# Patient Record
Sex: MALE | Race: WHITE | NOT HISPANIC OR LATINO | Employment: OTHER | URBAN - METROPOLITAN AREA
[De-identification: names, ages, dates, MRNs, and addresses within clinical notes are randomized per-mention and may not be internally consistent; named-entity substitution may affect disease eponyms.]

---

## 2021-02-02 LAB — SURGICAL PATHOLOGY REPORT: NORMAL

## 2022-01-05 LAB
ABO: NORMAL
ANION GAP SERPL CALCULATED.3IONS-SCNC: 15 MMOL/L (ref 10–20)
ANTIBODY SCREEN: NORMAL
BICARBONATE: 25 MMOL/L (ref 21–32)
CALCIUM SERPL-MCNC: 9.4 MG/DL (ref 8.6–10.6)
CHLORIDE BLD-SCNC: 106 MMOL/L (ref 98–107)
CREAT SERPL-MCNC: 1.15 MG/DL (ref 0.5–1.3)
ERYTHROCYTE [DISTWIDTH] IN BLOOD BY AUTOMATED COUNT: 13.8 % (ref 11.5–14)
GFR AFRICAN AMERICAN: >60 ML/MIN/1.73M2
GFR NON-AFRICAN AMERICAN: >60 ML/MIN/1.73M2
GLUCOSE: 76 MG/DL (ref 74–99)
HCT VFR BLD CALC: 43.8 % (ref 41–52)
HEMOGLOBIN: 14.3 G/DL (ref 13.5–17)
MCHC RBC AUTO-ENTMCNC: 32.6 G/DL (ref 32–36)
MCV RBC AUTO: 95 FL (ref 80–100)
NUCLEATED RBCS: 0 /100 WBC (ref 0–0)
PLATELET # BLD: 169 X10E9/L (ref 150–450)
POTASSIUM SERPL-SCNC: 3.9 MMOL/L (ref 3.5–5.3)
RBC # BLD: 4.63 X10E12/L (ref 4.5–5.9)
RH TYPE: NORMAL
SODIUM BLD-SCNC: 142 MMOL/L (ref 136–145)
UREA NITROGEN: 9 MG/DL (ref 6–23)
WBC: 6.4 X10E9/L (ref 4.4–11.3)

## 2022-02-16 LAB
ABO: NORMAL
ANION GAP SERPL CALCULATED.3IONS-SCNC: 20 MMOL/L (ref 10–20)
ANTIBODY SCREEN: NORMAL
BICARBONATE: 23 MMOL/L (ref 21–32)
CALCIUM SERPL-MCNC: 9.7 MG/DL (ref 8.6–10.6)
CHLORIDE BLD-SCNC: 103 MMOL/L (ref 98–107)
CREAT SERPL-MCNC: 1.02 MG/DL (ref 0.5–1.3)
EGFR MALE: 78 ML/MIN/1.73M2
ERYTHROCYTE [DISTWIDTH] IN BLOOD BY AUTOMATED COUNT: 13.7 % (ref 11.5–14)
GLUCOSE: 63 MG/DL (ref 74–99)
HCT VFR BLD CALC: 51.6 % (ref 41–52)
HEMOGLOBIN: 16.7 G/DL (ref 13.5–17)
MCHC RBC AUTO-ENTMCNC: 32.4 G/DL (ref 32–36)
MCV RBC AUTO: 91 FL (ref 80–100)
NUCLEATED RBCS: 0 /100 WBC (ref 0–0)
PLATELET # BLD: 215 X10E9/L (ref 150–450)
POTASSIUM SERPL-SCNC: 4 MMOL/L (ref 3.5–5.3)
RBC # BLD: 5.64 X10E12/L (ref 4.5–5.9)
RH TYPE: NORMAL
SARS-COV-2, PCR: NOT DETECTED
SODIUM BLD-SCNC: 142 MMOL/L (ref 136–145)
SPECIMEN SOURCE: NORMAL
UREA NITROGEN: 11 MG/DL (ref 6–23)
WBC: 10.1 X10E9/L (ref 4.4–11.3)

## 2023-10-13 ENCOUNTER — TELEPHONE (OUTPATIENT)
Dept: ORTHOPEDIC SURGERY | Facility: CLINIC | Age: 73
End: 2023-10-13
Payer: COMMERCIAL

## 2023-10-13 DIAGNOSIS — G54.6 PHANTOM PAIN AFTER AMPUTATION OF LOWER EXTREMITY (MULTI): ICD-10-CM

## 2023-10-13 DIAGNOSIS — S78.119A ABOVE-KNEE AMPUTATION (MULTI): ICD-10-CM

## 2023-10-13 DIAGNOSIS — M19.011 OSTEOARTHROSIS, LOCALIZED, PRIMARY, SHOULDER REGION, RIGHT: Primary | ICD-10-CM

## 2023-10-13 RX ORDER — OXYCODONE HYDROCHLORIDE 5 MG/1
5 TABLET ORAL EVERY 8 HOURS PRN
Qty: 60 TABLET | Refills: 0 | Status: SHIPPED | OUTPATIENT
Start: 2023-10-13 | End: 2023-11-13 | Stop reason: SDUPTHER

## 2023-10-13 RX ORDER — OXYCODONE HYDROCHLORIDE 5 MG/1
5 TABLET ORAL EVERY 8 HOURS PRN
COMMUNITY
Start: 2023-09-14 | End: 2023-10-13 | Stop reason: SDUPTHER

## 2023-10-13 NOTE — TELEPHONE ENCOUNTER
Sent to [facility].  I know he's still a little early  considering the inpatient meds he got, but this poor franko has multiple reasons for pain, and the oxy probably helps keep him from drinking alcohol

## 2023-11-13 DIAGNOSIS — G54.6 PHANTOM PAIN AFTER AMPUTATION OF LOWER EXTREMITY (MULTI): ICD-10-CM

## 2023-11-13 DIAGNOSIS — S78.119A ABOVE-KNEE AMPUTATION (MULTI): ICD-10-CM

## 2023-11-13 DIAGNOSIS — M19.011 OSTEOARTHROSIS, LOCALIZED, PRIMARY, SHOULDER REGION, RIGHT: ICD-10-CM

## 2023-11-13 RX ORDER — OXYCODONE HYDROCHLORIDE 5 MG/1
5 TABLET ORAL EVERY 8 HOURS PRN
Qty: 60 TABLET | Refills: 0 | Status: SHIPPED | OUTPATIENT
Start: 2023-11-13 | End: 2023-12-13 | Stop reason: SDUPTHER

## 2023-11-20 ENCOUNTER — OFFICE VISIT (OUTPATIENT)
Dept: ORTHOPEDIC SURGERY | Facility: CLINIC | Age: 73
End: 2023-11-20
Payer: COMMERCIAL

## 2023-11-20 DIAGNOSIS — G89.4 CHRONIC PAIN DISORDER: ICD-10-CM

## 2023-11-20 PROCEDURE — 99214 OFFICE O/P EST MOD 30 MIN: CPT | Mod: GC | Performed by: PHYSICAL MEDICINE & REHABILITATION

## 2023-11-20 PROCEDURE — 1159F MED LIST DOCD IN RCRD: CPT | Performed by: PHYSICAL MEDICINE & REHABILITATION

## 2023-11-20 PROCEDURE — 99214 OFFICE O/P EST MOD 30 MIN: CPT | Performed by: PHYSICAL MEDICINE & REHABILITATION

## 2023-11-20 PROCEDURE — 1125F AMNT PAIN NOTED PAIN PRSNT: CPT | Performed by: PHYSICAL MEDICINE & REHABILITATION

## 2023-11-20 RX ORDER — GABAPENTIN 300 MG/1
300 CAPSULE ORAL 3 TIMES DAILY
Qty: 90 CAPSULE | Refills: 11 | Status: SHIPPED | OUTPATIENT
Start: 2023-11-20 | End: 2023-12-13 | Stop reason: SDUPTHER

## 2023-11-20 NOTE — PROGRESS NOTES
FUV      History of Present IllnessSeen at the request of myself - I treated in [facility] [place] rehab.     CC: Follow up for phantom pain evaluation and bilateral shoulder pain.     HPI: F/u for right shoulder pain and repeat evaluation of phantom pain of his right leg, left total hip arthroplasty by Dr. Carter uncomplicated.     Has been ozempic for 5 months. Losing weight. Is starting with a prosthetic but now needs to be fitted.  Has not worked with PT yet.  Left knee replaced 7 years ago, past year has become more painful. Has had falls onto that knee- fell last week on wheelchair.     Continues to have more diffuse shoulder pain and stiffness BILATERALLY, difficulty with prosthesis fit but he is still trying to use it more. Not using walker much due to shoulder pain. Last injections helped for about 3 months, would like repeat injections.      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 .      Patient reports no fevers, chills, sweats, night pain, (unintentional) weight loss or cancer history      Pertinent Physical Exam (see remainder below):  MSK:   Evaluated in wheelchair.  Decreased bilateral shoulder range of motion of the shoulders bilaterally right greater than left.  Right shoulder range of motion mildly reduced in external and flexion and severely reduced in external rotation.     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 Jesus Alberto Carter did left total hip arthroplasty Winter 2022    RECOMMENDATIONS:   - Repeat  Ultrasound guided Bilateral subacromial and anterior glenohumeral joint injections at next available appointment  -Previously discussed surgery for shoulder - probably replacement but but with his right sided transfemoral amputation he will need inpatient rehab or SNF afterwards, and I could facilitate this at [place].  -PNS was denied. - Likely he would need spinal cord stimulator implant. Recall, denial by private insurance company.   - Increasing gabapentin to 300 tid from 300 daily  ( [facility] since [facility])  - Continue oxycodone 5 mg, reduce dose to 2 tablets/day, I suggested splitting them in half and substituting with Tylenol, UDS and controlled substance agreement 11/20/2023. Target of 60 tablets/mo maximum. SEE CONTROLLED SUBSTANCE HPI FORM  f/u ~90d ?AC joint injection     Supervisory note:  Seen with Dr Shannon Nugent, resident.  I saw and evaluated the patient. I personally obtained the key and critical portions of the history and physical exam. I reviewed the resident's documentation and discussed the patient with the resident. I agree with the resident's medical decision making as documented in the resident's note.       Same pain, [facility], needs repeat shoulder injections f/u 90d for  meds.    Rickey Wadsworth M.D, FAAPMR, R-MSK    Chief, Division of PM&R  Board Certified in PM&R, Sports Medicine and Musculoskeletal Ultrasound    Addendum 11/22/2023 - called pt as overdue lab notification popped up.  Wife said he went to lab but they didn't have an order.  I verified that order was in and told them to go to local lab today.  Not wait until injection apt next week as not policy and no lab available at [facility]      Diagnoses and plan discussed with the patient, patient educated on above diagnoses and treatments, including alternatives   Rickey Wadsworth MD, FAAPMR, R-MSK  Chief, Division of PM&R  Board Certified in PM&R  and Sports Medicine  ____________________________________________________________________     SUPPORTING DOCUMENTATION (remaining history, exam, other findings):     Work-up reviewed - this has included x-ray hips, moderate to severe osteoarthritis bilaterally right greater than left prior x-ray of shoulder was 2021     Treatment has included above, medications multiple, physical therapy, inpatient rehab, hip joint injections left side x2  Right subacromial injection helped for about 1.5 months. Back to [facility] now. Then glenohumeral injection was helpful but on last visit October 28, 2022 he had more pain in the superior aspect of the shoulder and we did AC joint injection again with relief there.  See above for Assessment and Plan           Last urine drug screening date/ordered today: 11/20/23   Controlled Substance Agreement:   I have printed this form and reviewed each line item with the patient and the patient has verbalized understanding.   Date of the last Controlled Substance Agreement: 11/20/23  OPIOID   What is the patient's goal of therapy? pain relief, ambulation with prosthesis.  Is this being achieved with current treatment? yes.   Attestation statement: I feel that it is clinically indicated to continue this current medication regimen after consideration of alternative therapies, and other non-opioid treatments.   Opioid Risk Screening:   Opioid Risk Tool   Last opioid risk screening date/ordered today: 11/20/23  Personal history of substance abuse: Alcohol = 3  Psychological disease: Positive History of Depression = 1  Patient's total score is 4,~within range of Moderate Risk (4-7).   Pain Scale Screening:   Pain Assessment and Documentation Tool (PADT)   Date of Assessment: 06/10/2022  Analgesia:   Patient reports his pain level on average during the past week is 6-7 on a 0 - 10 scale.   Patient reports that his pain level at its worst during the past week was 10 on a 0 -10 scale.   40% of pain has  been relieved during the past week per patient   Query to clinician: Is the patient's pain relief clinically significant? Yes  Activities of Daily Living:   Physical functioning: Better   Family relationships: Same   Social relationships: Same   Mood: Same   Sleep patterns: Same   Overall functioning: Same   Adverse Events:   No, FABIANA HIDALGO is not experiencing side effects from current pain reliever.  Patients overall severity of side effect: None  Is your overall impression that this patient is benefiting (e.g., benefits, such as pain relief, outweigh side effects) from opioid therapy? Yes   Potential Aberrant Drug-Related Behavior: Abusing alcohol or illicit drugs.   Specific Analgesic Plan: Continue present regimen.  I have calculated the patient's Morphine Dose Equivalent (MED):   I have considered referral to Pain Management and/or a specialist, and do not feel it is necessary at this time.   ANTICONVULSANT   Activities of Daily Living:   Physical functioning: Same  Family relationships: Same   Social relationships: Same   Mood: Same   Sleep patterns: Same   Overall functioning: Same   Referrals or Alternatives: Pain Management: 2019?,~Psychiatry/Psychotherapy/ Behavioral Health: ,~Occupational Therapy: 2021,~Physical Therapy: 06/2021 and ongoing,~Cold: ,~Heat: .   Current or Past Use of Non-Controlled Medication: Topical Therapy,~NSAID,~Gabapentin.

## 2023-11-22 ENCOUNTER — LAB (OUTPATIENT)
Dept: LAB | Facility: LAB | Age: 73
End: 2023-11-22
Payer: COMMERCIAL

## 2023-11-22 DIAGNOSIS — G89.4 CHRONIC PAIN DISORDER: ICD-10-CM

## 2023-11-22 PROCEDURE — 82570 ASSAY OF URINE CREATININE: CPT

## 2023-11-22 PROCEDURE — 80354 DRUG SCREENING FENTANYL: CPT

## 2023-11-22 PROCEDURE — 80365 DRUG SCREENING OXYCODONE: CPT

## 2023-11-22 PROCEDURE — 80373 DRUG SCREENING TRAMADOL: CPT

## 2023-11-22 PROCEDURE — 80307 DRUG TEST PRSMV CHEM ANLYZR: CPT

## 2023-11-22 PROCEDURE — 80358 DRUG SCREENING METHADONE: CPT

## 2023-11-22 PROCEDURE — 80361 OPIATES 1 OR MORE: CPT

## 2023-11-22 PROCEDURE — 80368 SEDATIVE HYPNOTICS: CPT

## 2023-11-22 PROCEDURE — 80346 BENZODIAZEPINES1-12: CPT

## 2023-11-23 LAB
AMPHETAMINES UR QL SCN: NORMAL
BARBITURATES UR QL SCN: NORMAL
BZE UR QL SCN: NORMAL
CANNABINOIDS UR QL SCN: NORMAL
CREAT UR-MCNC: 90.1 MG/DL (ref 20–370)
PCP UR QL SCN: NORMAL

## 2023-11-29 ENCOUNTER — OFFICE VISIT (OUTPATIENT)
Dept: ORTHOPEDIC SURGERY | Facility: CLINIC | Age: 73
End: 2023-11-29
Payer: COMMERCIAL

## 2023-11-29 DIAGNOSIS — M19.011 OSTEOARTHROSIS, LOCALIZED, PRIMARY, SHOULDER REGION, RIGHT: Primary | ICD-10-CM

## 2023-11-29 NOTE — PROGRESS NOTES
FUV      History of Present Illness  Seen at the request of Dr. Wadsworth  - I treated in [place] rehab.     CC: Follow up for bilateral shoulder pain.     HPI:  Patient is a 74 y/o male with PMH Right AKA of being followed for  right shoulder pain and repeat evaluation of phantom pain of his right leg, left total hip arthroplasty by Dr. Carter uncomplicated.     Continues to have more diffuse shoulder pain and stiffness BILATERALLY. Not using walker much due to shoulder pain. Last injections helped for about 3 months, presenting today for repeat injections.      Patient reports no fevers, chills, sweats, night pain, (unintentional) weight loss or cancer history      Pertinent Physical Exam (see remainder below):  MSK:   Evaluated in wheelchair.  Decreased bilateral shoulder range of motion of the shoulders is limited bilaterally right greater than left.  Right shoulder range of motion mildly reduced in external and flexion and severely reduced in external rotation.     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 Jesus Alberto Carter did left total hip arthroplasty Winter 2022    RECOMMENDATIONS:   - Today preformed Ultrasound guided Bilateral subacromial and anterior glenohumeral joint injections at next available appointment  -Previously discussed surgery for shoulder - probably replacement but but with his right sided transfemoral amputation he will need inpatient rehab or SNF afterwards, and I could facilitate this at [place].  -PNS and spinal cord stimulator denied by private insurance company.   - gabapentin to 300 tid   - Continue oxycodone 5 mg, reduce dose to 2  tablets/day,     -Procedure: Ultrasound guided Bilateral subacromial and anterior glenohumeral joint injection. The risks, benefits, alternatives and anticipated outcomes of the procedure, and the roles and tasks of the personnel to be involved, were discussed with the patient, and the patient consents to the procedure and agrees to proceed.   The procedure was carried out under sterile prep with sterile gel. A brief sonographic assessment of region showed no significant effusion, deep so low resolution precluded diagnostic assessment. A 25 ga 1.5 inch echogenic needle was introduced and advanced with ultrasound guidance from from lateral to medial into right subacromial space. Then, it was removed and inserted from the medial to lateral into the anterior glenohumeral joint. Following negative aspiration, a mixture of 4 cc of lidocaine and 1 cc of Kenalog (40mg/ml) was injected.   A 25 ga 2 inch echogenic needle was introduced and advanced with ultrasound guidance from from lateral to medial into left subacromial space. Then, it was removed and inserted from the medial to lateral into the anterior glenohumeral joint. Following negative aspiration, a mixture of 4 cc of lidocaine and 1 cc of Kenalog (40mg/ml) was injected.   Ultrasound interpretation was performed prior to the procedure to identify the target and any adjacent neurovascular structures. Subsequently, interpretation was performed during real-time needle guidance confirming placement. Post-intervention interpretation was also performed confirming appropriate injectate flow and hemostasis. The patient tolerated the procedure without complication and was instructed in post-procedure precautions.     Cony Meier DO  MSK and Spine Fellow (PM&R), [facility] Department of Orthopedics   Academic Instructor, [employer]  [address] [place] [address]. [place] [address]  Phone: [phone]  Fax: [phone] l  Inj/Asp: bilateral glenohumeral on 6/13/2024 8:29 AM  Indications: pain  Details: 25 G needle, ultrasound-guided anterolateral approach  Medications (Right): 40 mg triamcinolone acetonide 40 mg/mL; 4 mL lidocaine PF 10 mg/mL (1 %)  Medications (Left): 40 mg triamcinolone acetonide 40 mg/mL; 4 mL lidocaine PF 10 mg/mL (1 %)  Procedure, treatment alternatives, risks and benefits explained, specific risks discussed. Patient was prepped and draped in the usual sterile fashion.

## 2023-12-04 LAB
1OH-MIDAZOLAM UR CFM-MCNC: <25 NG/ML
6MAM UR CFM-MCNC: <25 NG/ML
7AMINOCLONAZEPAM UR CFM-MCNC: <25 NG/ML
A-OH ALPRAZ UR CFM-MCNC: <25 NG/ML
ALPRAZ UR CFM-MCNC: <25 NG/ML
CHLORDIAZEP UR CFM-MCNC: <25 NG/ML
CLONAZEPAM UR CFM-MCNC: <25 NG/ML
CODEINE UR CFM-MCNC: <50 NG/ML
DIAZEPAM UR CFM-MCNC: <25 NG/ML
EDDP UR CFM-MCNC: <25 NG/ML
FENTANYL UR CFM-MCNC: <2.5 NG/ML
HYDROCODONE CTO UR CFM-MCNC: <25 NG/ML
HYDROMORPHONE UR CFM-MCNC: <25 NG/ML
LORAZEPAM UR CFM-MCNC: <25 NG/ML
METHADONE UR CFM-MCNC: <25 NG/ML
MIDAZOLAM UR CFM-MCNC: <25 NG/ML
MORPHINE UR CFM-MCNC: <50 NG/ML
NORDIAZEPAM UR CFM-MCNC: <25 NG/ML
NORFENTANYL UR CFM-MCNC: <2.5 NG/ML
NORHYDROCODONE UR CFM-MCNC: <25 NG/ML
NOROXYCODONE UR CFM-MCNC: >1000 NG/ML
NORTRAMADOL UR-MCNC: <50 NG/ML
OXAZEPAM UR CFM-MCNC: <25 NG/ML
OXYCODONE UR CFM-MCNC: 2228 NG/ML
OXYMORPHONE UR CFM-MCNC: 1339 NG/ML
TEMAZEPAM UR CFM-MCNC: <25 NG/ML
TRAMADOL UR CFM-MCNC: <50 NG/ML
ZOLPIDEM UR CFM-MCNC: <25 NG/ML
ZOLPIDEM UR-MCNC: <25 NG/ML

## 2023-12-13 DIAGNOSIS — S78.119A ABOVE-KNEE AMPUTATION (MULTI): ICD-10-CM

## 2023-12-13 DIAGNOSIS — G89.4 CHRONIC PAIN DISORDER: ICD-10-CM

## 2023-12-13 DIAGNOSIS — M19.011 OSTEOARTHROSIS, LOCALIZED, PRIMARY, SHOULDER REGION, RIGHT: ICD-10-CM

## 2023-12-13 DIAGNOSIS — G54.6 PHANTOM PAIN AFTER AMPUTATION OF LOWER EXTREMITY (MULTI): ICD-10-CM

## 2023-12-13 RX ORDER — OXYCODONE HYDROCHLORIDE 5 MG/1
5 TABLET ORAL EVERY 8 HOURS PRN
Qty: 60 TABLET | Refills: 0 | Status: SHIPPED | OUTPATIENT
Start: 2023-12-13 | End: 2024-01-15 | Stop reason: SDUPTHER

## 2023-12-13 RX ORDER — GABAPENTIN 300 MG/1
300 CAPSULE ORAL 3 TIMES DAILY
Qty: 90 CAPSULE | Refills: 11 | Status: SHIPPED | OUTPATIENT
Start: 2023-12-13 | End: 2024-02-14 | Stop reason: SDUPTHER

## 2024-01-15 DIAGNOSIS — M19.011 OSTEOARTHROSIS, LOCALIZED, PRIMARY, SHOULDER REGION, RIGHT: ICD-10-CM

## 2024-01-15 DIAGNOSIS — S78.119A ABOVE-KNEE AMPUTATION (MULTI): ICD-10-CM

## 2024-01-15 DIAGNOSIS — G54.6 PHANTOM PAIN AFTER AMPUTATION OF LOWER EXTREMITY (MULTI): ICD-10-CM

## 2024-01-15 RX ORDER — OXYCODONE HYDROCHLORIDE 5 MG/1
5 TABLET ORAL EVERY 8 HOURS PRN
Qty: 60 TABLET | Refills: 0 | Status: SHIPPED | OUTPATIENT
Start: 2024-01-15 | End: 2024-02-14 | Stop reason: SDUPTHER

## 2024-02-13 NOTE — PROGRESS NOTES
FUV      History of Present Illness     CC: Follow up for bilateral shoulder pain.     HPI:  Patient is a 74 y/o male with PMH Right AKA of being followed for  right shoulder pain and repeat evaluation of phantom pain of his right leg, left total hip arthroplasty by Dr. Carter uncomplicated.     Continues  to have more diffuse shoulder pain and stiffness BILATERALLY at rest 7-8/10, at its worst 10/10. Not using walker much due to shoulder pain. Last injections on 11/29/23 improved his symptoms by 100% for about 2 months, presenting today for repeat injections.      Patient reports no fevers, chills, sweats, night pain, (unintentional) weight loss or cancer history      Pertinent Physical Exam (see remainder below):  MSK:   Evaluated in wheelchair.  Decreased bilateral shoulder range of motion of the shoulders is limited bilaterally right greater than left.  Right shoulder range of motion mildly reduced in external and flexion and severely reduced in external rotation.     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 Jesus Alberto Carter did left total hip arthroplasty Winter 2022    RECOMMENDATIONS:   - Today preformed Ultrasound guided Bilateral subacromial and anterior glenohumeral joint injections at next available appointment  -Previously discussed surgery for shoulder - probably replacement but with his right sided transfemoral amputation he will need inpatient rehab or SNF afterwards, and I could facilitate this at [place].  -PNS and spinal cord stimulator denied by private insurance company.   - gabapentin to 300 tid   - Continue oxycodone 5 mg, 2  tablets/day    -Procedure: Ultrasound guided Bilateral subacromial and anterior glenohumeral joint injection. The risks, benefits, alternatives and anticipated outcomes of the procedure, and the roles and tasks of the personnel to be involved, were discussed with the patient, and the patient consents to the procedure and agrees to proceed.   The procedure was carried out under sterile prep with sterile gel. A brief sonographic assessment of region showed no significant effusion, deep so low resolution precluded diagnostic assessment. A 25 ga 1.5 inch echogenic needle was introduced and advanced with ultrasound guidance from from lateral to medial into right subacromial space. Then, it was removed and inserted from the medial to lateral into the anterior glenohumeral joint. Following negative aspiration, a mixture of 4 cc of lidocaine and 1 cc of Kenalog (40mg/ml) was injected.   A 25 ga 2 inch echogenic needle was introduced and advanced with ultrasound guidance from from lateral to medial into left subacromial space. Then, it was removed and inserted from the medial to lateral into the anterior glenohumeral joint. Following negative aspiration, a mixture of 4 cc of lidocaine and 1 cc of Kenalog (40mg/ml) was injected.   Ultrasound interpretation was performed prior to the procedure to identify the target and any adjacent neurovascular structures. Subsequently, interpretation was performed during real-time needle guidance confirming placement. Post-intervention interpretation was also performed confirming appropriate injectate flow and hemostasis. The patient tolerated the procedure without complication and was instructed in post-procedure precautions.     Cony Meier DO  MSK and Spine Fellow (PM&R), [facility] Department of Orthopedics   Academic Instructor, [employer]  [address] [place] [address]. [place] [address]  Phone: [phone]  Fax: [phone] l  Inj/Asp: bilateral subacromial bursa on 2/14/2024 1:13 PM  Indications: pain  Details: 25 G needle, ultrasound-guided anterolateral approach  Medications (Right): 5 mg triamcinolone acetonide 40 mg/mL; 4 mL lidocaine PF 10 mg/mL (1 %)  Medications (Left): 5 mg triamcinolone acetonide 40 mg/mL; 4 mL lidocaine PF 10 mg/mL (1 %)  Procedure, treatment alternatives, risks and benefits explained, specific risks discussed. Consent was given by the patient.

## 2024-02-14 ENCOUNTER — OFFICE VISIT (OUTPATIENT)
Dept: ORTHOPEDIC SURGERY | Facility: CLINIC | Age: 74
End: 2024-02-14
Payer: COMMERCIAL

## 2024-02-14 DIAGNOSIS — S78.119A ABOVE-KNEE AMPUTATION (MULTI): ICD-10-CM

## 2024-02-14 DIAGNOSIS — M19.011 OSTEOARTHROSIS, LOCALIZED, PRIMARY, SHOULDER REGION, RIGHT: ICD-10-CM

## 2024-02-14 DIAGNOSIS — M19.011 OSTEOARTHROSIS, LOCALIZED, PRIMARY, SHOULDER REGION, RIGHT: Primary | ICD-10-CM

## 2024-02-14 DIAGNOSIS — G54.6 PHANTOM PAIN AFTER AMPUTATION OF LOWER EXTREMITY (MULTI): ICD-10-CM

## 2024-02-14 DIAGNOSIS — G89.4 CHRONIC PAIN DISORDER: ICD-10-CM

## 2024-02-14 PROCEDURE — 20611 DRAIN/INJ JOINT/BURSA W/US: CPT | Performed by: STUDENT IN AN ORGANIZED HEALTH CARE EDUCATION/TRAINING PROGRAM

## 2024-02-14 PROCEDURE — 1125F AMNT PAIN NOTED PAIN PRSNT: CPT | Performed by: STUDENT IN AN ORGANIZED HEALTH CARE EDUCATION/TRAINING PROGRAM

## 2024-02-14 PROCEDURE — 1159F MED LIST DOCD IN RCRD: CPT | Performed by: STUDENT IN AN ORGANIZED HEALTH CARE EDUCATION/TRAINING PROGRAM

## 2024-02-14 PROCEDURE — 99213 OFFICE O/P EST LOW 20 MIN: CPT | Performed by: STUDENT IN AN ORGANIZED HEALTH CARE EDUCATION/TRAINING PROGRAM

## 2024-02-14 RX ORDER — TRIAMCINOLONE ACETONIDE 40 MG/ML
5 INJECTION, SUSPENSION INTRA-ARTICULAR; INTRAMUSCULAR
Status: COMPLETED | OUTPATIENT
Start: 2024-02-14 | End: 2024-02-14

## 2024-02-14 RX ORDER — OXYCODONE HYDROCHLORIDE 5 MG/1
5 TABLET ORAL EVERY 8 HOURS PRN
Qty: 60 TABLET | Refills: 0 | Status: SHIPPED | OUTPATIENT
Start: 2024-02-14 | End: 2024-02-14 | Stop reason: SDUPTHER

## 2024-02-14 RX ORDER — LIDOCAINE HYDROCHLORIDE 10 MG/ML
4 INJECTION, SOLUTION EPIDURAL; INFILTRATION; INTRACAUDAL; PERINEURAL
Status: COMPLETED | OUTPATIENT
Start: 2024-02-14 | End: 2024-02-14

## 2024-02-14 RX ORDER — OXYCODONE HYDROCHLORIDE 5 MG/1
5 TABLET ORAL EVERY 8 HOURS PRN
Qty: 60 TABLET | Refills: 0 | Status: SHIPPED | OUTPATIENT
Start: 2024-02-14 | End: 2024-03-14 | Stop reason: SDUPTHER

## 2024-02-14 RX ORDER — GABAPENTIN 300 MG/1
300 CAPSULE ORAL 3 TIMES DAILY
Qty: 90 CAPSULE | Refills: 11 | Status: SHIPPED | OUTPATIENT
Start: 2024-02-14 | End: 2025-02-13

## 2024-02-14 RX ADMIN — LIDOCAINE HYDROCHLORIDE 4 ML: 10 INJECTION, SOLUTION EPIDURAL; INFILTRATION; INTRACAUDAL; PERINEURAL at 13:13

## 2024-02-14 RX ADMIN — TRIAMCINOLONE ACETONIDE 5 MG: 40 INJECTION, SUSPENSION INTRA-ARTICULAR; INTRAMUSCULAR at 13:13

## 2024-03-14 ENCOUNTER — OFFICE VISIT (OUTPATIENT)
Dept: ORTHOPEDIC SURGERY | Facility: CLINIC | Age: 74
End: 2024-03-14
Payer: COMMERCIAL

## 2024-03-14 DIAGNOSIS — S78.119A ABOVE-KNEE AMPUTATION (MULTI): ICD-10-CM

## 2024-03-14 DIAGNOSIS — G54.6 PHANTOM PAIN AFTER AMPUTATION OF LOWER EXTREMITY (MULTI): ICD-10-CM

## 2024-03-14 DIAGNOSIS — M19.011 OSTEOARTHROSIS, LOCALIZED, PRIMARY, SHOULDER REGION, RIGHT: ICD-10-CM

## 2024-03-14 DIAGNOSIS — M54.12 CERVICAL RADICULITIS: Primary | ICD-10-CM

## 2024-03-14 PROCEDURE — 1159F MED LIST DOCD IN RCRD: CPT | Performed by: PHYSICAL MEDICINE & REHABILITATION

## 2024-03-14 PROCEDURE — 99214 OFFICE O/P EST MOD 30 MIN: CPT | Performed by: PHYSICAL MEDICINE & REHABILITATION

## 2024-03-14 RX ORDER — OXYCODONE HYDROCHLORIDE 5 MG/1
5 TABLET ORAL EVERY 8 HOURS PRN
Qty: 60 TABLET | Refills: 0 | Status: SHIPPED | OUTPATIENT
Start: 2024-03-14 | End: 2024-04-04 | Stop reason: SDUPTHER

## 2024-03-14 NOTE — PROGRESS NOTES
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 .  f/u for oxycodone , right shoulder pain and repeat evaluation of phantom pain of his right leg, left total hip arthroplastyâ€“by Dr. Carter uncomplicated.      CC: Follow up for phantom pain  and bilateral shoulder pain.     HPI:   Ultrasound-guided bilateral glenohumeral and subacromial injections by Dr. Caitlyn Meier D.O. our fellow in November and then again February 14 2024 helps significantly with the right shoulder but the left side only a little, now feels pain in the left side more in the upper shoulder/lower neck with radiation all the way down the forearm and back of the hand.  Right shoulder still doing well       Has ongoing difficulty with prosthesis fit and he has essentially stopped using it, most time in wheelchair but occasionally with walker, pivot transfers but using the walker is difficult due to due to shoulder pain.      Patient reports no fevers, chills, sweats, night pain, weight loss or cancer history      Pertinent Physical Exam (see remainder below):  MSK:   Cervical Spine: ROM moderately reduced all planes but more painful to left, Posture mod kyphotic, Tender psp, Spurling pos to left  decreased bilateral shoulder range of motion of the shoulders bilaterally right greater than left. right shoulder range of motion mildly reduced in external and flexion and severely reduced in external rotation.   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 Jesus Alberto Carter did left total hip arthroplasty Winter 2022  .  RECOMMENDATIONS:   - Get MRI cervical ?left radiculitis  - discussed possible right shoulder SS, Axillary and Lat pec block/RFA -will re-address when steroid injection wears off  -Previously discussed surgery for shoulder - probably replacement but but with his right sided transfemoral amputation he will need inpatient rehab or SNF afterwards, and I could facilitate this at [place].  -PNS was denied. - Likely he would need spinal cord stimulator implant. Recall, denial by private insurance company.   - off of neurontin due to oversedation?   - continue oxycodone 5 mg, reduce dose to 2 tablets/day, I suggested splitting them in half and substituting with Tylenol, UDS and controlled substance agreement 10/28/2022 -UDS was repeated Oct 2023 at OSH . Target of 60 tablets/mo maximum. SEE CONTROLLED SUBSTANCE HPI FORM  f/u for virtual visit after MRI then ~90d for meds      Diagnoses and plan discussed with the patient, patient educated on above diagnoses and treatments, including alternatives   Rickey Wadsworth MD, FAAPMR, R-MSK  Chief, Division of PM&R  Board Certified in PM&R and Sports Medicine  ____________________________________________________________________     SUPPORTING DOCUMENTATION (remaining history, exam, other findings):     Work-up reviewed - this has included x-ray hips, moderate to severe osteoarthritis bilaterally right greater than left prior x-ray of shoulder was 2021     Treatment has included above, medications multiple, physical therapy, inpatient rehab, hip joint injections left side x2  Right subacromial injection helped for about 1.5 months. Back to [facility] now. Then  glenohumeral injection was helpful but on last visit October 28, 2022 he had more pain in the superior aspect of the shoulder and we did AC joint injection again with relief there.  See above for Assessment and Plan          Last urine drug screening date/ordered: 10/22/2023 - [facility] - [place]   Controlled Substance Agreement:   I have printed this form and reviewed each line item with the patient and the patient has verbalized understanding.   Date of the last Controlled Substance Agreement:    OPIOID   What is the patient's goal of therapy? pain relief, ambulation with prosthesis.  Is this being achieved with current treatment? yes.   Attestation statement: I feel that it is clinically indicated to continue this current medication regimen after consideration of alternative therapies, and other non-opioid treatments.   Opioid Risk Screening:   Opioid Risk Tool   Last opioid risk screening date/ordered today: 10/30/2023  Personal history of substance abuse: Alcohol = 3  Psychological disease: Positive History of Depression = 1  Patient's total score is 4,~within range of Moderate Risk (4-7).   Pain Scale Screening:   Pain Assessment and Documentation Tool (PADT)   Date of Assessment: 08/22/2023  Analgesia:   Patient reports his pain level on average during the past week is 7 on a 0 - 10 scale.   Patient reports that his pain level at its worst during the past week was 10 on a 0 -10 scale.   40 % of pain has been relieved during the past week per patient   Query to clinician: Is the patient's pain relief clinically significant? Yes  Activities of Daily Living:   Physical functioning: Better   Family relationships: Same   Social relationships: Same   Mood: Same   Sleep patterns: Same   Overall functioning: Same   Adverse Events:   No, FABIANA HIDALGO is not experiencing side effects from current pain reliever.  Patients overall severity of side effect: None  Is your overall impression that this patient is benefiting (e.g.,  benefits, such as pain relief, outweigh side effects) from opioid therapy? Yes   Potential Aberrant Drug-Related Behavior: Abusing alcohol or illicit drugs.   Specific Analgesic Plan: Continue present regimen.  I have calculated the patient's Morphine Dose Equivalent (MED):   I have considered referral to Pain Management and/or a specialist, and do not feel it is necessary at this time.   ANTICONVULSANT   Activities of Daily Living:   Physical functioning: Better   Family relationships: Same   Social relationships: Same   Mood: Same   Sleep patterns: Same   Overall functioning: Same   Referrals or Alternatives: Pain Management: 2019?,~Psychiatry/Psychotherapy/ Behavioral Health: ,~Occupational Therapy: 2021,~Physical Therapy: 06/2021 and ongoing,~Cold: ,~Heat: .   Current or Past Use of Non-Controlled Medication: Topical Therapy,~NSAID,~Gabapentin.

## 2024-03-27 ENCOUNTER — HOSPITAL ENCOUNTER (EMERGENCY)
Facility: HOSPITAL | Age: 74
Discharge: HOME | End: 2024-03-28
Attending: EMERGENCY MEDICINE
Payer: COMMERCIAL

## 2024-03-27 ENCOUNTER — APPOINTMENT (OUTPATIENT)
Dept: RADIOLOGY | Facility: HOSPITAL | Age: 74
End: 2024-03-27
Payer: COMMERCIAL

## 2024-03-27 DIAGNOSIS — F10.920 ALCOHOLIC INTOXICATION WITHOUT COMPLICATION (CMS-HCC): ICD-10-CM
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
## 2024-03-27 DIAGNOSIS — S09.90XA CLOSED HEAD INJURY, INITIAL ENCOUNTER: Primary | ICD-10-CM

## 2024-03-27 DIAGNOSIS — R33.9 URINARY RETENTION: ICD-10-CM

## 2024-03-27 PROCEDURE — 12011 RPR F/E/E/N/L/M 2.5 CM/<: CPT

## 2024-03-27 PROCEDURE — 72125 CT NECK SPINE W/O DYE: CPT | Performed by: RADIOLOGY

## 2024-03-27 PROCEDURE — 70450 CT HEAD/BRAIN W/O DYE: CPT

## 2024-03-27 PROCEDURE — 72125 CT NECK SPINE W/O DYE: CPT

## 2024-03-27 PROCEDURE — 51702 INSERT TEMP BLADDER CATH: CPT | Mod: 59

## 2024-03-27 PROCEDURE — 70450 CT HEAD/BRAIN W/O DYE: CPT | Performed by: RADIOLOGY

## 2024-03-27 PROCEDURE — 99285 EMERGENCY DEPT VISIT HI MDM: CPT | Mod: 25

## 2024-03-27 PROCEDURE — 2500000005 HC RX 250 GENERAL PHARMACY W/O HCPCS: Performed by: EMERGENCY MEDICINE

## 2024-03-27 PROCEDURE — 51798 US URINE CAPACITY MEASURE: CPT

## 2024-03-27 RX ADMIN — Medication 1 TUBE: at 21:50

## 2024-03-27 ASSESSMENT — PAIN SCALES - WONG BAKER: WONGBAKER_NUMERICALRESPONSE: NO HURT

## 2024-03-27 ASSESSMENT — LIFESTYLE VARIABLES
HAVE YOU EVER FELT YOU SHOULD CUT DOWN ON YOUR DRINKING: YES
EVER HAD A DRINK FIRST THING IN THE MORNING TO STEADY YOUR NERVES TO GET RID OF A HANGOVER: YES
EVER FELT BAD OR GUILTY ABOUT YOUR DRINKING: YES
HAVE PEOPLE ANNOYED YOU BY CRITICIZING YOUR DRINKING: YES
TOTAL SCORE: 4

## 2024-03-27 ASSESSMENT — PAIN - FUNCTIONAL ASSESSMENT: PAIN_FUNCTIONAL_ASSESSMENT: WONG-BAKER FACES

## 2024-03-28 ENCOUNTER — APPOINTMENT (OUTPATIENT)
Dept: RADIOLOGY | Facility: HOSPITAL | Age: 74
End: 2024-03-28
Payer: COMMERCIAL

## 2024-03-28 VITALS
WEIGHT: 214.95 LBS | BODY MASS INDEX: 32.58 KG/M2 | HEART RATE: 88 BPM | DIASTOLIC BLOOD PRESSURE: 92 MMHG | SYSTOLIC BLOOD PRESSURE: 153 MMHG | HEIGHT: 68 IN | TEMPERATURE: 98.1 F | OXYGEN SATURATION: 100 % | RESPIRATION RATE: 17 BRPM

## 2024-03-28 LAB
ALBUMIN SERPL-MCNC: 3.9 G/DL (ref 3.5–5)
ALP BLD-CCNC: 63 U/L (ref 35–125)
ALT SERPL-CCNC: 16 U/L (ref 5–40)
ANION GAP SERPL CALC-SCNC: 13 MMOL/L
APPEARANCE UR: CLEAR
AST SERPL-CCNC: 16 U/L (ref 5–40)
BASOPHILS # BLD AUTO: 0.03 X10*3/UL (ref 0–0.1)
BASOPHILS NFR BLD AUTO: 0.4 %
BILIRUB SERPL-MCNC: 0.5 MG/DL (ref 0.1–1.2)
BILIRUB UR STRIP.AUTO-MCNC: NEGATIVE MG/DL
BUN SERPL-MCNC: 5 MG/DL (ref 8–25)
CALCIUM SERPL-MCNC: 8.7 MG/DL (ref 8.5–10.4)
CHLORIDE SERPL-SCNC: 105 MMOL/L (ref 97–107)
CO2 SERPL-SCNC: 23 MMOL/L (ref 24–31)
COLOR UR: COLORLESS
CREAT SERPL-MCNC: 0.9 MG/DL (ref 0.4–1.6)
EGFRCR SERPLBLD CKD-EPI 2021: 90 ML/MIN/1.73M*2
EOSINOPHIL # BLD AUTO: 0.04 X10*3/UL (ref 0–0.4)
EOSINOPHIL NFR BLD AUTO: 0.5 %
ERYTHROCYTE [DISTWIDTH] IN BLOOD BY AUTOMATED COUNT: 15.7 % (ref 11.5–14.5)
ETHANOL SERPL-MCNC: 0.3 G/DL
GLUCOSE SERPL-MCNC: 95 MG/DL (ref 65–99)
GLUCOSE UR STRIP.AUTO-MCNC: NORMAL MG/DL
HCT VFR BLD AUTO: 35.9 % (ref 41–52)
HGB BLD-MCNC: 11.9 G/DL (ref 13.5–17.5)
HOLD SPECIMEN: NORMAL
IMM GRANULOCYTES # BLD AUTO: 0.05 X10*3/UL (ref 0–0.5)
IMM GRANULOCYTES NFR BLD AUTO: 0.7 % (ref 0–0.9)
KETONES UR STRIP.AUTO-MCNC: NEGATIVE MG/DL
LEUKOCYTE ESTERASE UR QL STRIP.AUTO: NEGATIVE
LYMPHOCYTES # BLD AUTO: 1.34 X10*3/UL (ref 0.8–3)
LYMPHOCYTES NFR BLD AUTO: 18.3 %
MCH RBC QN AUTO: 29.9 PG (ref 26–34)
MCHC RBC AUTO-ENTMCNC: 33.1 G/DL (ref 32–36)
MCV RBC AUTO: 90 FL (ref 80–100)
MONOCYTES # BLD AUTO: 0.34 X10*3/UL (ref 0.05–0.8)
MONOCYTES NFR BLD AUTO: 4.7 %
NEUTROPHILS # BLD AUTO: 5.51 X10*3/UL (ref 1.6–5.5)
NEUTROPHILS NFR BLD AUTO: 75.4 %
NITRITE UR QL STRIP.AUTO: NEGATIVE
NRBC BLD-RTO: 0 /100 WBCS (ref 0–0)
PH UR STRIP.AUTO: 6 [PH]
PLATELET # BLD AUTO: 137 X10*3/UL (ref 150–450)
POTASSIUM SERPL-SCNC: 3.1 MMOL/L (ref 3.4–5.1)
PROT SERPL-MCNC: 6.5 G/DL (ref 5.9–7.9)
PROT UR STRIP.AUTO-MCNC: NEGATIVE MG/DL
RBC # BLD AUTO: 3.98 X10*6/UL (ref 4.5–5.9)
RBC # UR STRIP.AUTO: NEGATIVE /UL
SODIUM SERPL-SCNC: 141 MMOL/L (ref 133–145)
SP GR UR STRIP.AUTO: 1.01
UROBILINOGEN UR STRIP.AUTO-MCNC: NORMAL MG/DL
WBC # BLD AUTO: 7.3 X10*3/UL (ref 4.4–11.3)

## 2024-03-28 PROCEDURE — 85025 COMPLETE CBC W/AUTO DIFF WBC: CPT | Performed by: EMERGENCY MEDICINE

## 2024-03-28 PROCEDURE — 82077 ASSAY SPEC XCP UR&BREATH IA: CPT | Performed by: EMERGENCY MEDICINE

## 2024-03-28 PROCEDURE — 80053 COMPREHEN METABOLIC PANEL: CPT | Performed by: EMERGENCY MEDICINE

## 2024-03-28 PROCEDURE — 51702 INSERT TEMP BLADDER CATH: CPT

## 2024-03-28 PROCEDURE — 96375 TX/PRO/DX INJ NEW DRUG ADDON: CPT

## 2024-03-28 PROCEDURE — 2500000004 HC RX 250 GENERAL PHARMACY W/ HCPCS (ALT 636 FOR OP/ED): Performed by: STUDENT IN AN ORGANIZED HEALTH CARE EDUCATION/TRAINING PROGRAM

## 2024-03-28 PROCEDURE — 70450 CT HEAD/BRAIN W/O DYE: CPT

## 2024-03-28 PROCEDURE — 70450 CT HEAD/BRAIN W/O DYE: CPT | Performed by: RADIOLOGY

## 2024-03-28 PROCEDURE — 96374 THER/PROPH/DIAG INJ IV PUSH: CPT

## 2024-03-28 PROCEDURE — 81003 URINALYSIS AUTO W/O SCOPE: CPT | Performed by: EMERGENCY MEDICINE

## 2024-03-28 PROCEDURE — 36415 COLL VENOUS BLD VENIPUNCTURE: CPT | Performed by: EMERGENCY MEDICINE

## 2024-03-28 RX ORDER — ONDANSETRON HYDROCHLORIDE 2 MG/ML
4 INJECTION, SOLUTION INTRAVENOUS ONCE
Status: COMPLETED | OUTPATIENT
Start: 2024-03-28 | End: 2024-03-28

## 2024-03-28 RX ORDER — FAMOTIDINE 10 MG/ML
20 INJECTION INTRAVENOUS ONCE
Status: COMPLETED | OUTPATIENT
Start: 2024-03-28 | End: 2024-03-28

## 2024-03-28 RX ORDER — ONDANSETRON HYDROCHLORIDE 2 MG/ML
4 INJECTION, SOLUTION INTRAVENOUS ONCE
Status: DISCONTINUED | OUTPATIENT
Start: 2024-03-28 | End: 2024-03-28 | Stop reason: HOSPADM

## 2024-03-28 RX ADMIN — ONDANSETRON 4 MG: 2 INJECTION INTRAMUSCULAR; INTRAVENOUS at 05:39

## 2024-03-28 RX ADMIN — SODIUM CHLORIDE 500 ML: 900 INJECTION, SOLUTION INTRAVENOUS at 05:34

## 2024-03-28 RX ADMIN — FAMOTIDINE 20 MG: 10 INJECTION, SOLUTION INTRAVENOUS at 05:39

## 2024-03-28 ASSESSMENT — PAIN - FUNCTIONAL ASSESSMENT: PAIN_FUNCTIONAL_ASSESSMENT: 0-10

## 2024-03-28 NOTE — ED NOTES
Physician assessed patient condition. Per physician do not attempt to straight cath patient at this time     Jeanine Brian LPN  03/28/24 [id number]

## 2024-03-28 NOTE — ED NOTES
Patient medicated per md orders wife at bedside assisting patient with urinal     Jeanine Brian, MAGY  03/28/24 [id number]

## 2024-03-28 NOTE — ED NOTES
"As this nurse was updating patient charting, this nurse overheard a loud noise and immediately went to patients room at which time patient was found sitting on left side in an upright position with head against wall. This nurse immediately assessed patient for new onset injury and found none, patient had no complaint of pain or injury and when asked how patient had gotten on floor patient stated, \"I gotta pee\". This nurse then notified charge nurse and physician of incident , then called for assistance to get patient back to bed due to patient having a right BKA. Patient was moved from bed 20 to bed 21 to be more readily observable. Patient was provided with a bed alarm and urinal at which time patient stated \"I don't gotta pee.\" And threw urinal on floor. Patient is AxOx1 only recalling name, not time, day or where patient is. Patient is heavily intoxicated and incoherent at this time unable to answer any questions.    Wife arrived to bedside at 5:25 am and stated she had a \"feeling\" she should come.    Proper paperwork and charting performed by this nurse, wife updated. Physician at bedside speaking to wife at 5:28 am     Jeanine Brian LPN  03/28/24 0529    "

## 2024-03-28 NOTE — ED NOTES
Patient responsive to verbal stimuli such as calling of name . Patient unable to answer any questions due to being heavily intoxicated and sleeping     Jeanine Brian LPN  03/27/24 2035

## 2024-03-28 NOTE — ED PROVIDER NOTES
Care the patient was signed out to me by the overnight physician at 6 AM.  He is a 73-year-old male who was brought to the ED after a fall.  He is acutely intoxicated with alcohol and admits to drinking a large amount of last night.  Repeat alcohol level at midnight showed persistently elevated alcohol level at 300.  His wife is here this morning and still feels that he significantly intoxicated.  Will continue to wait for him to metabolize this, plan is to discharge the patient home when he is clinically sober and she feels safe taking him home.    He did have a fall overnight, head and neck CT were ordered which showed no acute intracranial injury, no C-spine injury.  Medical workup is otherwise unremarkable with a normal EKG, no acute electrolyte abnormality or evidence of ketoacidosis, acute infection, UTI or other abnormality. He did have urinary retention of about 1600 cc of urine during his stay in the ED, he felt like he had to go but was unable to.  Holder catheter was placed.  This will be maintained until follow-up with urology as an outpatient.    On reassessment today He feels more sober, was able to ambulate at baseline in the ED.  He is clinically sober.  He was discharged in improved condition.    Physical Exam  General: well developed, well nourished 73-year-old male who is awake and alert, in no apparent distress  Eyes: sclera clear bilaterally, PERRL, EOMI  HENT: normocephalic, atraumatic.  CV: regular rate and rhythm, no murmur, no gallops, or rubs.   Resp: clear to ascultation bilaterally, no wheezes, rales, or rhonchi  GI: abdomen soft, nontender without rigidity or guarding, no peritoneal signs, abdomen is nondistended, no masses palpated  MSK: S/p right AKA.  Psych: appropriate mood and affect, cooperative with exam  Skin: warm, dry, without evidence of rash or abrasions       Florentin Mojica,   03/28/24 6790

## 2024-03-28 NOTE — DISCHARGE INSTRUCTIONS
Follow-up with urology for further evaluation of urinary retention that was found in the emergency department during her visit.  Follow Holder catheter care instructions as provided.  Return to the ED for any new or worsening symptoms including concerns for your safety at home due to his frequent falls, any new or worsening symptoms that you feel require emergent evaluation.

## 2024-03-28 NOTE — PROGRESS NOTES
Emergency Medicine Transition of Care Note.    I received Lalo Pack in signout from Dr. Holley.  Please see the previous ED provider note for all HPI, PE and MDM up to the time of signout at 2200. This is in addition to the primary record.    In brief Lalo Pack is an 73 y.o. male presenting for altered mental status  Chief Complaint   Patient presents with    Fall     At the time of signout we were awaiting: Blood work and sobriety    ED Course as of 04/03/24 1643   Wed Mar 27, 2024   2231 EKG personally interpreted by me performed at 2048  Normal sinus rhythm with a ventricular rate 78 normal axis and intervals no acute ischemic changes [EF]   2250 S/o acutely intoxicated, fell out of wheelchair, MTF and likely dc [DH]      ED Course User Index  [DH] Benito Peña MD  [EF] Fabienne Holley,          Diagnoses as of 04/03/24 1643   Closed head injury, initial encounter   Fall, initial encounter   Alcoholic intoxication without complication (CMS/[facility])   Urinary retention       Medical Decision Making  Patient is restless and insisting on sitting on side of bed.  Informed nurse of this and recommend that patient be placed in the back of the bed but she is preferring to continue observation.  Patient sustained unwitnessed fall at this time, denies head strike or pain at this time.  Patient assisted back into bed and placed in bed with bed alarm activated.    Will repeat CT head at this time given continued altered mental status.    Patient continues to appear clinically intoxicated.  Ethanol is significantly elevated at 0.303.  Wife at bedside notes that patient is not a frequent drinker but when he does drink he binges.    Lab work otherwise unremarkable with no significant leukocytosis or anemia and comprehensive metabolic panel shows no evidence of electrolyte abnormality, SHELBIE, or LFT abnormality.    Patient administered fluids, Zofran, and Pepcid at this time and signed out at 0600 hrs. pending  reassessment and CT scan result by incoming Dr. Mojica.    Final diagnoses:   [S09.90XA] Closed head injury, initial encounter   [W19.XXXA] Fall, initial encounter   [F10.920] Alcoholic intoxication without complication (CMS/HCC)   [R33.9] Urinary retention           Procedure  Procedures    Benito Peña MD

## 2024-03-28 NOTE — ED PROVIDER NOTES
EMERGENCY DEPARTMENT ENCOUNTER      Pt Name: Lalo Pack  MRN: [id number]  Birthdate 1950  Date of evaluation: 3/27/2024  ED Provider: Fabienne Holley DO     CHIEF COMPLAINT       Chief Complaint   Patient presents with    Fall       HISTORY OF PRESENT ILLNESS    Lalo Pack is a 73 y.o. who presents to the emergency department after a fall.  He has a history of a right AKA after dealing with a septic prosthetic joint.  He drinks regularly and mobilizes using a wheelchair.  His wife was assisting him up the ramp when there was a slight lip and he fell forward striking his face on the ground.  EMS was called.  Triage note does state that he did appear to pass out but wife does not feel that there was a loss of consciousness.  The patient presents now for wound evaluation.  The patient himself is unable to give a history secondary to intoxication    Nursing Notes were reviewed.    REVIEW OF SYSTEMS     Focused ROS performed and negative other than as listed in HPI    PAST MEDICAL HISTORY     Past Medical History:   Diagnosis Date    Fracture of unspecified metatarsal bone(s), unspecified foot, initial encounter for closed fracture 01/18/2020    Fracture of metatarsal bone, closed    Other specified disorders of bone, lower leg 10/10/2019    Tibial pain    Pain in right foot 10/14/2019    Right foot pain    Pain, unspecified 04/23/2021    Pain    Unspecified fracture of shaft of right tibia, initial encounter for closed fracture 09/23/2021    Fracture of right tibia       SURGICAL HISTORY     No past surgical history on file.    CURRENT MEDICATIONS       Previous Medications    GABAPENTIN (NEURONTIN) 300 MG CAPSULE    Take 1 capsule (300 mg) by mouth 3 times a day.    OXYCODONE (ROXICODONE) 5 MG IMMEDIATE RELEASE TABLET    Take 1 tablet (5 mg) by mouth every 8 hours if needed for severe pain (7 - 10).       ALLERGIES     Cephalexin, Simvastatin, Triamterene-hydrochlorothiazid, and Ibuprofen    FAMILY  HISTORY     No family history on file.     SOCIAL HISTORY       Social History     Socioeconomic History    Marital status:      Spouse name: Not on file    Number of children: Not on file    Years of education: Not on file    Highest education level: Not on file   Occupational History    Not on file   Tobacco Use    Smoking status: Not on file    Smokeless tobacco: Not on file   Substance and Sexual Activity    Alcohol use: Not on file    Drug use: Not on file    Sexual activity: Not on file   Other Topics Concern    Not on file   Social History Narrative    Not on file     Social Determinants of Health     Financial Resource Strain: Not on file   Food Insecurity: Not on file   Transportation Needs: Not on file   Physical Activity: Not on file   Stress: Not on file   Social Connections: Not on file   Intimate Partner Violence: Not on file   Housing Stability: Not on file       PHYSICAL EXAM       ED Triage Vitals [03/27/24 2031]   Temperature Heart Rate Respirations BP   36.8 °C (98.2 °F) 88 18 141/87      Pulse Ox Temp Source Heart Rate Source Patient Position   94 % Temporal -- --      BP Location FiO2 (%)     -- --          General: Appears somnolent but arousable to voice  Head: Small laceration to the bridge of the nose that is well-approximated nongaping, bleeding is controlled normocephalic  Eyes: normal inspection; no icterus  ENT: mucosa moist without lesion; PERRLA  Neck: Normal inspection, no meningeal signs  Resp: Normal breath sounds, no wheeze or crackles; No respiratory distress  Chest Wall: no tenderness or deformity  Heart: Heart rate and rhythm regular; No Murmurs  Abdomen: Soft, Non-tender; No distention, guarding, rigidity, or rebound  MSK: Right AKA moves all extremities; No Pedal edema  Neuro: moves all extremities; somnolent but arousable to voice  Skin: Color appropriate; warm; Dry    DIAGNOSTIC RESULTS   Lab and radiology results are independently interpreted unless noted  below.  RADIOLOGY (Per Emergency Physician):     Interpretation per the Radiologist below, if available at the time of this note:  CT cervical spine wo IV contrast   Final Result   CT HEAD:   No acute intracranial abnormality or calvarial fracture.             CT CERVICAL SPINE:   No acute fracture or traumatic malalignment of the cervical spine.        Signed by: Jesus Alberto Acevedo 3/27/2024 10:28 PM   Dictation workstation:   [id number]      CT head wo IV contrast   Final Result   CT HEAD:   No acute intracranial abnormality or calvarial fracture.             CT CERVICAL SPINE:   No acute fracture or traumatic malalignment of the cervical spine.        Signed by: Jesus Alberto Acevedo 3/27/2024 10:28 PM   Dictation workstation:   [id number]          LABS:  Abnormal Labs Reviewed - No abnormal labs to display    All other labs were within normal range or not returned as of this dictation.    EMERGENCY DEPARTMENT COURSE/MDM:   Patient presents with a head injury after falling out of his wheelchair.  The wife states that he does drink regularly and likely did not drink tonight.  On exam he is somnolent but arousable.  He is maintaining his airway.  Head neck CT will be obtained.  Wound is closed with Dermabond.  However should the workup returned unremarkable patient at this time is not stable for discharge.  Wife expresses concern that she will be unable to get him in and out of the house unless he is more able to assist.  Therefore he will be watched in the emergency department until time of sobriety when he is more awake and alert.  Wife will be notified at that time and is agreeable with this plan.      ED Course as of 03/27/24 2248   Wed Mar 27, 2024   2231 EKG personally interpreted by me performed at 2048  Normal sinus rhythm with a ventricular rate 78 normal axis and intervals no acute ischemic changes [EF]      ED Course User Index  [EF] Fabienne Holley DO         Diagnoses as of 03/27/24 2248   Closed head  injury, initial encounter   Fall, initial encounter   Alcoholic intoxication without complication (CMS/[facility])     Meds Administered:  Medications   2-octyl cyanoacrylate (Octylseal) adhesive 1 Tube (1 Tube Topical Given 3/27/24 2150)     PROCEDURES:  Unless otherwise noted below, none  Procedures      FINAL IMPRESSION      1. Closed head injury, initial encounter    2. Fall, initial encounter    3. Alcoholic intoxication without complication (CMS/[facility])          DISPOSITION     Sign out to Dr Peña at 2248        (Comment: Please note this report has been produced using speech recognition software and may contain errors related to that system including errors in grammar, punctuation, and spelling, as well as words and phrases that may be inappropriate.  If there are any questions or concerns please feel free to contact the dictating provider for clarification.)    Fabienne Holley DO (electronically signed)  Emergency Medicine Physician    History Limited by: Altered Mental Status/Confusion  Independent history obtained from: Significant Other/Spouse  External records reviewed: None  Diagnostics interpreted by me: EKG - see my independent interpretation elsewhere in the chart  Discussions with other clinicians: None  Chronic conditions impacting care: HTN  Social determinants of health affecting care: Alcoholism  Diagnostic tests considered but not performed: n/a  ED Medications managed:  Medications   2-octyl cyanoacrylate (Octylseal) adhesive 1 Tube (1 Tube Topical Given 3/27/24 2150)       Prescription drugs considered: None             Fabienne Holley DO  03/27/24 2777

## 2024-03-28 NOTE — ED NOTES
Provider notified of blood alcohol level. No new orders at this time, patient is rousable with verbal and physical stimuli. Has no c/o pain or discomfort when roused. Patient sleeping, chest rises and falls at equal intervals and patient takes breaths. Responds verbally to calling of name and acknowledges this nurses presence in room.      Jeanine Brian LPN  03/28/24 0139

## 2024-03-28 NOTE — ED NOTES
Patient had vodka , right bka , wife tried to help him into house via wheelchair , patient fell out of wheelchair , cut on bridge of nose , responds to verbal stimuli 151/87 98ra 114 blood glucose     Jeanine Brian, MAGY  03/27/24 2031

## 2024-03-28 NOTE — ED NOTES
Patient transferred himself to bedside chair believing it was a wheelchair in an attempt to use restroom, this nurse responded to bedside and took patient to restroom with a wheelchair at which time patient attempted to urinate but stated he could not. Patient was then returned to bed and returned to sleep and bedside chair was removed from room to prevent further patient confusion or attempts to self transfer. Patient is now sleeping again.     Jeanine Brian LPN  03/28/24 0248       Jeanine Brian LPN  03/28/24 0530       Jeanine Brian LPN  03/28/24 0530

## 2024-03-28 NOTE — ED NOTES
Per wife  Patient took the dog outside and was in his wheelchair and she smelled alcohol on him, she went outside to check on patient and dog patient was hunched over to wheelchair due to being cold she took patient in house via wheelchair, patient leaned forward and tumbled from chair hitting his face on the wheelchair ramp and his head on the bar. Patient then crawled into house and was unable to get himself up and then appeared to pass out , stopped responding verbally to wife .  Wife called neighbor to assist and couldn't lift patient, wife called fire department and fire department told wife he needed to be transported for assessment at which time patient was transferred here.     Jeanine Brian, MAGY  03/27/24 4702

## 2024-03-28 NOTE — ED NOTES
Reported results of bladder scan to physician . Patient found to be in retention of > 897ml of urine. Ordered to place catheter.     Jeanine Brian LPN  03/28/24 0546

## 2024-04-02 ENCOUNTER — HOSPITAL ENCOUNTER (OUTPATIENT)
Dept: RADIOLOGY | Facility: HOSPITAL | Age: 74
Discharge: HOME | End: 2024-04-02
Payer: COMMERCIAL

## 2024-04-02 DIAGNOSIS — G54.6 PHANTOM PAIN AFTER AMPUTATION OF LOWER EXTREMITY (MULTI): ICD-10-CM

## 2024-04-02 DIAGNOSIS — M19.011 OSTEOARTHROSIS, LOCALIZED, PRIMARY, SHOULDER REGION, RIGHT: ICD-10-CM

## 2024-04-02 DIAGNOSIS — S78.119A ABOVE-KNEE AMPUTATION (MULTI): ICD-10-CM

## 2024-04-02 DIAGNOSIS — M54.12 CERVICAL RADICULITIS: ICD-10-CM

## 2024-04-02 PROCEDURE — 72141 MRI NECK SPINE W/O DYE: CPT

## 2024-04-02 PROCEDURE — 72141 MRI NECK SPINE W/O DYE: CPT | Performed by: RADIOLOGY

## 2024-04-04 ENCOUNTER — TELEMEDICINE (OUTPATIENT)
Dept: ORTHOPEDIC SURGERY | Facility: CLINIC | Age: 74
End: 2024-04-04
Payer: COMMERCIAL

## 2024-04-04 DIAGNOSIS — S78.119A ABOVE-KNEE AMPUTATION (MULTI): ICD-10-CM

## 2024-04-04 DIAGNOSIS — M19.011 OSTEOARTHROSIS, LOCALIZED, PRIMARY, SHOULDER REGION, RIGHT: Primary | ICD-10-CM

## 2024-04-04 DIAGNOSIS — G54.6 PHANTOM PAIN AFTER AMPUTATION OF LOWER EXTREMITY (MULTI): ICD-10-CM

## 2024-04-04 PROCEDURE — 1159F MED LIST DOCD IN RCRD: CPT | Performed by: PHYSICAL MEDICINE & REHABILITATION

## 2024-04-04 PROCEDURE — 99214 OFFICE O/P EST MOD 30 MIN: CPT | Performed by: PHYSICAL MEDICINE & REHABILITATION

## 2024-04-04 PROCEDURE — 3008F BODY MASS INDEX DOCD: CPT | Performed by: PHYSICAL MEDICINE & REHABILITATION

## 2024-04-04 RX ORDER — OXYCODONE HYDROCHLORIDE 5 MG/1
5 TABLET ORAL EVERY 8 HOURS PRN
Qty: 90 TABLET | Refills: 0 | Status: SHIPPED | OUTPATIENT
Start: 2024-04-04 | End: 2024-05-06 | Stop reason: SDUPTHER

## 2024-04-04 NOTE — PROGRESS NOTES
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 .  f/u for oxycodone , right shoulder pain and repeat evaluation of phantom pain of his right leg, left total hip arthroplastyâ€“by Dr. Carter uncomplicated.      CC: Follow up for phantom pain  and bilateral shoulder pain.     HPI:   Ultrasound-guided bilateral glenohumeral and subacromial injections by Dr. Caitlyn Meier D.O. our fellow in November and then again February 14 2024 - worse off already from Feb ?difficult placement per pt.  Decided against surgical referral.  significantly with the right shoulder but the left side only a little, now feels pain in the left side more in the upper shoulder/lower neck with radiation all the way down the forearm and back of the hand.  Right shoulder still doing well       Has ongoing difficulty with prosthesis fit and he has essentially stopped using it, most time in wheelchair but occasionally with walker, pivot transfers but using the walker is difficult due to due to shoulder pain.      Patient reports no fevers, chills, sweats, night pain, weight loss or cancer history      Pertinent Physical Exam (see remainder below):  MSK:   Cervical Spine: ROM moderately reduced all planes but not tested aggressively because of known stenosis, previous Spurling pos to left  decreased   bilateral shoulder range of motion of the shoulders bilaterally right greater than left.   Affect is good, history is accurate and focused appropriately   [ From previous  Neuro: Normal Sensation, strength, bulk and tone of upper  limbs bilaterally except trace proximal weakness ?cuff/shoulder guarding] limited by virtual today     IMPRESSION:  New left cervical radiculitis  known multilevel spondylosis   ?left shoulder pain - ?bursitis   Chronic Right shoulder pain, glenohumeral and  AC joint osteoarthritis and subacromial bursitis on the right with incomplete supraspinatus tear  Right above-knee amputation for complications of total knee arthroplasty/osteomyelitis  Right phantom limb syndrome with very severe phantom pain  Bilateral hip osteoarthritis -Dr. Jesus Alberto Carter did left total hip arthroplasty Winter 2022  .  RECOMMENDATIONS:   - reviewed MRI cervical has severe C3 central stenosis, but no cord deformity or edema.  I think more of his pain is coming from the shoulders  - discussed possible repeat right shoulder SS, Axillary and Lat pec block/RFA but he said this really just cause more pain back and 2022 last time we tried it.  -I will repeat bilateral shoulder glenohumeral and subacromial injections with fluoroscopy in the surgery center with sedation.  I will look at shoulder joint space then.  -Dr. Meier and I alsopreviously discussed surgery for shoulder - probably replacement but but with his right sided transfemoral amputation he will need inpatient rehab or SNF afterwards, and I could facilitate this at [place].  -PNS was denied. - Likely he would need spinal cord stimulator implant. Recall, denial by private insurance company.   -Has restarted neurontin 300 a.m., 600 p.m. to help with sleep but despite this he has been drinking.  He says drinking only rarely, once every few months, just when the pain is most severe.  I strongly cautioned him to stop this, he declined offer foraddiction and alcoholism resources referral    -Will increase oxycodone 5 m back to 3g  tablets/day, I suggested splitting them in half and substituting with Tylenol, UDS and controlled substance agreement 10/28/2022 -UDS was repeated Oct 2023 at OSH . Target of 60 tablets/mo maximum. SEE CONTROLLED SUBSTANCE HPI FORM  - will try to repeat CSA at time of asc injection  f/u for virtual visit after MRI then ~90d for meds      Diagnoses and plan discussed with the patient, patient educated on above diagnoses  and treatments, including alternatives   Rickey Wadsworth MD, FAAPMR, R-MSK  Chief, Division of PM&R  Board Certified in PM&R and Sports Medicine  ____________________________________________________________________     SUPPORTING DOCUMENTATION (remaining history, exam, other findings):     Work-up reviewed - this has included x-ray hips, moderate to severe osteoarthritis bilaterally right greater than left prior x-ray of shoulder was 2021     Treatment has included above, medications multiple, physical therapy, inpatient rehab, hip joint injections left side x2  Right subacromial injection helped for about 1.5 months. Back to baselin now. Then glenohumeral injection was helpful but on last visit October 28, 2022 he had more pain in the superior aspect of the shoulder and we did AC joint injection again with relief there.  See above for Assessment and Plan          Last urine drug screening date/ordered: 10/22/2023 - [facility] [place]   Controlled Substance Agreement:   I have printed this form and reviewed each line item with the patient and the patient has verbalized understanding.   Date of the last Controlled Substance Agreement:    OPIOID   What is the patient's goal of therapy? pain relief, ambulation with prosthesis.  Is this being achieved with current treatment? yes.   Attestation statement: I feel that it is clinically indicated to continue this current medication regimen after consideration of alternative therapies, and other non-opioid treatments.   Opioid Risk Screening:   Opioid Risk Tool   Last opioid risk screening date/ordered today: 10/30/2023  Personal history of substance abuse: Alcohol = 3  Psychological disease: Positive History of Depression = 1  Patient's total score is 4,~within range of Moderate Risk (4-7).   Pain Scale Screening:   Pain Assessment and Documentation Tool (PADT)   Date of Assessment: 08/22/2023  Analgesia:   Patient reports his pain level on average during the past week is 8 on a  0 - 10 scale.   Patient reports that his pain level at its worst during the past week was 10 on a 0 -10 scale.   40 % of pain has been relieved during the past week per patient   Query to clinician: Is the patient's pain relief clinically significant? Yes  Activities of Daily Living:   Physical functioning: Better   Family relationships: Same   Social relationships: Same   Mood: Same   Sleep patterns: Same   Overall functioning: Same   Adverse Events:   No, FABIANA HIDALGO is not experiencing side effects from current pain reliever.  Patients overall severity of side effect: None  Is your overall impression that this patient is benefiting (e.g., benefits, such as pain relief, outweigh side effects) from opioid therapy? Yes   Potential Aberrant Drug-Related Behavior: Abusing alcohol or illicit drugs.   Specific Analgesic Plan: Continue present regimen.  I have calculated the patient's Morphine Dose Equivalent (MED):   I have considered referral to Pain Management and/or a specialist, and do not feel it is necessary at this time.   ANTICONVULSANT   Activities of Daily Living:   Physical functioning: worse  Family relationships: Same   Social relationships: Same   Mood: Same   Sleep patterns: Same   Overall functioning: Same   Referrals or Alternatives: Pain Management: 2019?,~Psychiatry/Psychotherapy/ Behavioral Health: ,~Occupational Therapy: 2021,~Physical Therapy: 06/2021 and ongoing,~Cold: ,~Heat: .   Current or Past Use of Non-Controlled Medication: Topical Therapy,~NSAID,~Gabapentin.   Unable to take NSAIDs because of chronic liver disease

## 2024-05-01 RX ORDER — TRIAMCINOLONE ACETONIDE 40 MG/ML
80 INJECTION, SUSPENSION INTRA-ARTICULAR; INTRAMUSCULAR ONCE
Status: COMPLETED | OUTPATIENT
Start: 2024-05-01 | End: 2024-06-07

## 2024-05-06 ENCOUNTER — TELEPHONE (OUTPATIENT)
Dept: OPERATING ROOM | Facility: CLINIC | Age: 74
End: 2024-05-06
Payer: COMMERCIAL

## 2024-05-06 DIAGNOSIS — M19.011 OSTEOARTHROSIS, LOCALIZED, PRIMARY, SHOULDER REGION, RIGHT: ICD-10-CM

## 2024-05-06 DIAGNOSIS — G54.6 PHANTOM PAIN AFTER AMPUTATION OF LOWER EXTREMITY (MULTI): ICD-10-CM

## 2024-05-06 DIAGNOSIS — S78.119A ABOVE-KNEE AMPUTATION (MULTI): ICD-10-CM

## 2024-05-06 RX ORDER — OXYCODONE HYDROCHLORIDE 5 MG/1
5 TABLET ORAL EVERY 8 HOURS PRN
Qty: 90 TABLET | Refills: 0 | Status: CANCELLED | OUTPATIENT
Start: 2024-05-06

## 2024-05-06 RX ORDER — OXYCODONE HYDROCHLORIDE 5 MG/1
5 TABLET ORAL EVERY 8 HOURS PRN
Qty: 90 TABLET | Refills: 0 | Status: SHIPPED | OUTPATIENT
Start: 2024-05-06 | End: 2024-06-03 | Stop reason: SDUPTHER

## 2024-05-06 NOTE — TELEPHONE ENCOUNTER
NICK PATIENT    Needs oxycodone 5mg TID quntity 90. Has been out since Friday. He was leaving messages rohini Can's voicemail but she is off.    Jamie Feliciano

## 2024-05-08 ENCOUNTER — APPOINTMENT (OUTPATIENT)
Dept: ORTHOPEDIC SURGERY | Facility: CLINIC | Age: 74
End: 2024-05-08
Payer: COMMERCIAL

## 2024-05-10 ENCOUNTER — TELEPHONE (OUTPATIENT)
Dept: ORTHOPEDIC SURGERY | Facility: CLINIC | Age: 74
End: 2024-05-10
Payer: COMMERCIAL

## 2024-05-10 DIAGNOSIS — F41.8 DEPRESSION WITH ANXIETY: Primary | ICD-10-CM

## 2024-05-10 RX ORDER — BUSPIRONE HYDROCHLORIDE 10 MG/1
5 TABLET ORAL 3 TIMES DAILY PRN
Qty: 45 TABLET | Refills: 2 | Status: SHIPPED | OUTPATIENT
Start: 2024-05-10

## 2024-05-10 RX ORDER — BUSPIRONE HYDROCHLORIDE 10 MG/1
5 TABLET ORAL 3 TIMES DAILY PRN
COMMUNITY
Start: 2024-02-07 | End: 2024-05-10 | Stop reason: SDUPTHER

## 2024-05-10 NOTE — TELEPHONE ENCOUNTER
Called patient as I couldn't remember prescribing anything and none listed in recent notes. Said he things his home health aide threw out the bottle -  it's not duloxetine as he has already been taking that already.  I found record of buspirone from OSH,  and I vaguely recall him taking this way back when I met him on the rehab hospital service in 2021.  He's pretty sure that's what he was thinking.  Discussed risk of oversedation/med interactions but he reassures not driving or drinking now.  Will use 5mg (1/2 tab) just sparingly and discuss further at f/u visit on 22nd

## 2024-05-10 NOTE — TELEPHONE ENCOUNTER
----- Message from Pamella Payne RN sent at 5/9/2024  2:15 PM EDT -----  Regarding: Medication for severe depression  Dr. Wadsworth,  Mr. Pack called and is requesting medication for severe depression.  Stated that you had ordered it before, but he doesn't remember the name.  Thank you and let me know, and I will call him back.  Pamella

## 2024-05-22 ENCOUNTER — HOSPITAL ENCOUNTER (OUTPATIENT)
Dept: RADIOLOGY | Facility: CLINIC | Age: 74
Setting detail: OUTPATIENT SURGERY
Discharge: HOME | End: 2024-05-22
Payer: COMMERCIAL

## 2024-05-22 ENCOUNTER — HOSPITAL ENCOUNTER (OUTPATIENT)
Dept: OPERATING ROOM | Facility: CLINIC | Age: 74
Setting detail: OUTPATIENT SURGERY
Discharge: HOME | End: 2024-05-22
Payer: COMMERCIAL

## 2024-05-22 VITALS
DIASTOLIC BLOOD PRESSURE: 83 MMHG | RESPIRATION RATE: 16 BRPM | TEMPERATURE: 97.2 F | HEART RATE: 68 BPM | SYSTOLIC BLOOD PRESSURE: 164 MMHG | HEIGHT: 70 IN | OXYGEN SATURATION: 97 % | BODY MASS INDEX: 29.35 KG/M2 | WEIGHT: 205 LBS

## 2024-05-22 DIAGNOSIS — M19.011 OSTEOARTHROSIS, LOCALIZED, PRIMARY, SHOULDER REGION, RIGHT: Primary | ICD-10-CM

## 2024-05-22 DIAGNOSIS — M19.011 OSTEOARTHROSIS, LOCALIZED, PRIMARY, SHOULDER REGION, RIGHT: ICD-10-CM

## 2024-05-22 PROCEDURE — 2500000004 HC RX 250 GENERAL PHARMACY W/ HCPCS (ALT 636 FOR OP/ED): Performed by: PHYSICAL MEDICINE & REHABILITATION

## 2024-05-22 PROCEDURE — 77002 NEEDLE LOCALIZATION BY XRAY: CPT | Performed by: PHYSICAL MEDICINE & REHABILITATION

## 2024-05-22 PROCEDURE — 2500000005 HC RX 250 GENERAL PHARMACY W/O HCPCS: Performed by: PHYSICAL MEDICINE & REHABILITATION

## 2024-05-22 PROCEDURE — 2550000001 HC RX 255 CONTRASTS: Performed by: PHYSICAL MEDICINE & REHABILITATION

## 2024-05-22 PROCEDURE — 20610 DRAIN/INJ JOINT/BURSA W/O US: CPT | Mod: 50 | Performed by: PHYSICAL MEDICINE & REHABILITATION

## 2024-05-22 PROCEDURE — 20605 DRAIN/INJ JOINT/BURSA W/O US: CPT | Mod: RT

## 2024-05-22 PROCEDURE — 20610 DRAIN/INJ JOINT/BURSA W/O US: CPT | Performed by: PHYSICAL MEDICINE & REHABILITATION

## 2024-05-22 PROCEDURE — 7100000010 HC PHASE TWO TIME - EACH INCREMENTAL 1 MINUTE

## 2024-05-22 PROCEDURE — 7100000009 HC PHASE TWO TIME - INITIAL BASE CHARGE

## 2024-05-22 PROCEDURE — 3600000001 HC OR TIME - INITIAL BASE CHARGE - PROCEDURE LEVEL ONE

## 2024-05-22 PROCEDURE — 3600000006 HC OR TIME - EACH INCREMENTAL 1 MINUTE - PROCEDURE LEVEL ONE

## 2024-05-22 RX ORDER — MIDAZOLAM HYDROCHLORIDE 1 MG/ML
INJECTION INTRAMUSCULAR; INTRAVENOUS AS NEEDED
Status: COMPLETED | OUTPATIENT
Start: 2024-05-22 | End: 2024-05-22

## 2024-05-22 RX ORDER — TRIAMCINOLONE ACETONIDE 40 MG/ML
INJECTION, SUSPENSION INTRA-ARTICULAR; INTRAMUSCULAR AS NEEDED
Status: COMPLETED | OUTPATIENT
Start: 2024-05-22 | End: 2024-05-22

## 2024-05-22 RX ORDER — LIDOCAINE HYDROCHLORIDE 10 MG/ML
INJECTION INFILTRATION; PERINEURAL AS NEEDED
Status: COMPLETED | OUTPATIENT
Start: 2024-05-22 | End: 2024-05-22

## 2024-05-22 RX ADMIN — TRIAMCINOLONE ACETONIDE 40 MG: 40 INJECTION, SUSPENSION INTRA-ARTICULAR; INTRAMUSCULAR at 11:02

## 2024-05-22 RX ADMIN — MIDAZOLAM HYDROCHLORIDE 2 MG: 1 INJECTION, SOLUTION INTRAMUSCULAR; INTRAVENOUS at 11:00

## 2024-05-22 RX ADMIN — IOHEXOL 5 ML: 240 INJECTION, SOLUTION INTRATHECAL; INTRAVASCULAR; INTRAVENOUS; ORAL at 11:02

## 2024-05-22 RX ADMIN — LIDOCAINE HYDROCHLORIDE 10 ML: 10 INJECTION, SOLUTION INFILTRATION; PERINEURAL at 11:00

## 2024-05-22 RX ADMIN — MIDAZOLAM HYDROCHLORIDE 1 MG: 1 INJECTION, SOLUTION INTRAMUSCULAR; INTRAVENOUS at 11:17

## 2024-05-22 ASSESSMENT — PAIN - FUNCTIONAL ASSESSMENT
PAIN_FUNCTIONAL_ASSESSMENT: 0-10

## 2024-05-22 ASSESSMENT — PAIN SCALES - GENERAL
PAINLEVEL_OUTOF10: 8
PAINLEVEL_OUTOF10: 10 - WORST POSSIBLE PAIN
PAINLEVEL_OUTOF10: 6
PAINLEVEL_OUTOF10: 6

## 2024-05-22 ASSESSMENT — COLUMBIA-SUICIDE SEVERITY RATING SCALE - C-SSRS
2. HAVE YOU ACTUALLY HAD ANY THOUGHTS OF KILLING YOURSELF?: NO
1. IN THE PAST MONTH, HAVE YOU WISHED YOU WERE DEAD OR WISHED YOU COULD GO TO SLEEP AND NOT WAKE UP?: NO
6. HAVE YOU EVER DONE ANYTHING, STARTED TO DO ANYTHING, OR PREPARED TO DO ANYTHING TO END YOUR LIFE?: NO

## 2024-05-22 NOTE — INTERVAL H&P NOTE
Patient reports no change in symptoms since  H&P/previous evaluation.  No f/c/s, no change in medical problems or comorbidities.  Heart and lung evaluation is normal  and neurological examination is unchanged from previous as follows:        Mallampati score 3  (1= uvula fully visible, 4= pharynx not visible)       Pt said he had injury right shoulder just gave out, went to [facility] and got [facility] -said they showed rotator cuff tear.   On my exam today has most tenderness over AC joint, still no sig effusion either joint bu the has very large shoulders.  Will add AC joint injection to above, GH and SAB inj.  I reassured patient that probably same problem just aggravated, we know from prior US that he already had cuff tears so much more likely just OA exacerbation    Rickey Wadsworth M.D, FAAPMR, R-MSK  Chief, Division of PM&R  Board Certified in PM&R, Sports Medicine and Musculoskeletal Ultrasound

## 2024-05-22 NOTE — OP NOTE
Operative Note - Medical Spine / PM&R     Date: 2024  [place] [facility] [place] [facility] [place]    Name: Lalo Pack : 1950, Age: 73 y.o., MRN: [id number], Sex: male    Diagnosis    1. Osteoarthrosis, localized, primary, shoulder region, right        Procedures  Bilateral shoulder glenohumeral and subacromial and right AC joint inejctions    Surgeons   Rickey Wadsworth MD    Resident/Fellow/Other Assistant:  Cony Meire DO Fellow performed the procedures with my direct supervision and hands on guidance for starting location    Procedure Summary  Estimated Blood Loss: 0mL    Intraprocedure I/O Totals       None           Specimen: No specimens collected     Staff:   Circulator: Sol Lopez RN  Scrub Person: True Phipps RN    Implants:  none      Findings: below    Indications: aLlo Pack is an 73 y.o. male who is having this procedure for indications above.  The patient was seen in the preoperative area. The risks, benefits, complications, treatment options, non-operative alternatives, expected recovery and outcomes were discussed with the patient. The possibilities of reaction to medication, pulmonary aspiration, injury to surrounding structures, bleeding, recurrent infection, the need for additional procedures, failure to diagnose a condition, and creating a complication requiring transfusion or operation were discussed with the patient. The patient concurred with the proposed plan, giving informed consent.  The site of surgery was properly noted/marked if necessary per policy. The patient has been actively warmed in preoperative area. Preoperative antibiotics not indicated.  Venous thrombosis prophylaxis not indicated    Procedure Details: Sedation: Versed 3mg IV was good    CLINICAL NOTE : After having previously explained the potential risks and benefits of the procedure, informed written consent was obtained.     PROCEDURE:  The patient was then taken back to the procedure room and  placed in a supine position and routine noninvasive monitors were applied.  A timeout was performed verifying patient identification, site and allergies.  The Left shoulder was prepped and draped in a sterile fashion. Under direct fluoroscopic guidance, the joint was identified.  The skin and subcutaneous tissues over the glenohumeral joint were anesthetized with 3cc of lidocaine and 0.5 cc sodium bicarbonate . Then a 3 1/2 inch, 22 gauge spinal needle spinal needle was directed toward the joint space directly overlying the humeral head. The needle position was confirmed fluoroscopically.  Omnipaque  cc was injected confirming dye flow into the joint space, highlighting the joint capsule.    The injection was completed with 2cc of  a mixture of   1cc Kenalog 40mg/cc and 4cc of lidocaine.     Then attention was directed to the subacromial space. Under direct fluoroscopic visualization, a 1.5in 25 gauge needle was inserted via a separate needle insertion the anterolateral subacromial space.   Omnipaque  1  cc was injected confirming dye flow into the bursal space.  The injection was completed with 2cc of  a mixture of   1cc Kenalog 40mg/cc and 4cc of lidocaine.    Then the Right shoulder  shoulder was prepped and draped in a sterile fashion. Under direct fluoroscopic guidance, the AC joint was identified.   Under direct fluoroscopic visualization, a 1.5in 25 gauge needle was inserted and the remaining 1cc of the mixture of Kenalog and Lidocaine from the left side was injected there.      Then the Right glenohumeral joint was identified fluoroscopically.  The skin and subcutaneous tissues over the glenohumeral joint were anesthetized with 3cc of lidocaine and 0.5 cc sodium bicarbonate . Then a 3 1/2 inch, 22 gauge spinal needle spinal needle was directed toward the joint space directly overlying the humeral head. The needle position was confirmed fluoroscopically.  Omnipaque  cc was injected confirming dye flow into  the joint space, highlighting the joint capsule.    The injection was completed with half of  a mixture of   1cc Kenalog 40mg/cc and 4cc of lidocaine.     Then attention was directed to the subacromial space. Under direct fluoroscopic visualization, a 1.5in 25 gauge needle was inserted via a separate needle insertion the anterolateral subacromial space.   Omnipaque  1  cc was injected confirming dye flow into the bursal space.  The injection was completed with half of  a mixture of   1cc Kenalog 40mg/cc and 4cc of lidocaine.      The patient tolerated the procedure well and without complications and was taken back to the recovery area in good condition.  Post procedure care, precautions and instructions given and pt verbalized understanding    Complications:  None     Disposition: PACU in stable condition    Attending Attestation: I was present and scrubbed for the entire procedure.    Rickey Wadsworth MD

## 2024-05-22 NOTE — DISCHARGE INSTRUCTIONS
"Post Procedure Instructions     1. You MUST have a  to take you home and be available to assist you at home if needed, unless specifically arranged before procedure.     2. Get up from your seated or lying position slowly and carefully. It is not unusual to have some \"numbness\" in your back or legs following a lumbar injection. After a cervical injection it is not abnormal to have arm or neck numbness. The symptoms (if they occur) may last a few hours, but will wear off. Have someone assist you as you walk if numbness in your legs occurs.     3. Complete sensory block is the intent of an injection to nerves. Occasionally in trying to achieve sensory blockade you also receive a motor blockade (weak arm or leg) 4. If you receive sedation, do not drive a motorized vehicle for 24 hours.     5. Avoid ALL alcoholic beverages the day of your procedure.     6. Discuss sleep and pain medications with your prescribing physician if you received sedation.     7. It is safe to resume medications once home.     8. Tomorrow you may resume regular activities to the level your pain will tolerate. The exception is no lifting over 20 pounds for 36 hours. You will likely experience a temporary exacerbation of pre-injection pain when anesthetic medication wears off.     9. Remove your band aid tomorrow.     10. Do not take a tub bath or submerge in water for 48 hours. You may shower.     11. Apply ice to your injection site if it is swollen or hurts after the injection. Only apply 20 minutes on, then off for 20 minutes. Take the ice off as you sleep.     12. Call scheduling office at [phone] to verify a follow-up appointment 4-6 weeks after your procedure / injection with your pain diary.     13. Call your physician immediately or go to the emergency room for any of the following symptoms:    Severe pain at your injection site (tightness or aching is normal)    Pain, redness, pus, or leaking fluid at the injection site    " "Prolonged or increasing numbness 14 hours after a block    A temperature over 101.5 degrees F and / or chills    Excessive bruising, bleeding, or swelling at the injection site    Loss of bowel or bladder control or \"saddle anesthesia\"    Inability to speak, facial droop, and / or limb paralysis     Note: if you have any questions please contact our office at [phone], if the office is closed.  Please call the after hours phone number at [phone] and ask for the pain resident on call    Rickey Wadsworth MD  ,   Physical Medicine and Rehabilitation, [facility]    To schedule FOLLOW-UP appointments, please use the call center at [phone].   To schedule future PROCEDURES or for questions for the doctor please call [phone].  "

## 2024-06-03 DIAGNOSIS — S78.119A ABOVE-KNEE AMPUTATION (MULTI): ICD-10-CM

## 2024-06-03 DIAGNOSIS — M19.011 OSTEOARTHROSIS, LOCALIZED, PRIMARY, SHOULDER REGION, RIGHT: ICD-10-CM

## 2024-06-03 DIAGNOSIS — G54.6 PHANTOM PAIN AFTER AMPUTATION OF LOWER EXTREMITY (MULTI): ICD-10-CM

## 2024-06-03 RX ORDER — OXYCODONE HYDROCHLORIDE 5 MG/1
5 TABLET ORAL EVERY 8 HOURS PRN
Qty: 90 TABLET | Refills: 0 | Status: SHIPPED | OUTPATIENT
Start: 2024-06-03

## 2024-06-07 RX ADMIN — TRIAMCINOLONE ACETONIDE 80 MG: 40 INJECTION, SUSPENSION INTRA-ARTICULAR; INTRAMUSCULAR at 14:37

## 2024-07-01 DIAGNOSIS — M19.011 OSTEOARTHROSIS, LOCALIZED, PRIMARY, SHOULDER REGION, RIGHT: ICD-10-CM

## 2024-07-01 DIAGNOSIS — S78.119A ABOVE-KNEE AMPUTATION (MULTI): ICD-10-CM

## 2024-07-01 DIAGNOSIS — G54.6 PHANTOM PAIN AFTER AMPUTATION OF LOWER EXTREMITY (MULTI): ICD-10-CM

## 2024-07-01 RX ORDER — OXYCODONE HYDROCHLORIDE 5 MG/1
5 TABLET ORAL EVERY 8 HOURS PRN
Qty: 90 TABLET | Refills: 0 | Status: SHIPPED | OUTPATIENT
Start: 2024-07-01

## 2024-07-30 DIAGNOSIS — G89.4 CHRONIC PAIN DISORDER: ICD-10-CM

## 2024-07-30 DIAGNOSIS — M19.011 OSTEOARTHROSIS, LOCALIZED, PRIMARY, SHOULDER REGION, RIGHT: ICD-10-CM

## 2024-07-30 DIAGNOSIS — S78.119A ABOVE-KNEE AMPUTATION (MULTI): ICD-10-CM

## 2024-07-30 DIAGNOSIS — G54.6 PHANTOM PAIN AFTER AMPUTATION OF LOWER EXTREMITY (MULTI): ICD-10-CM

## 2024-07-30 RX ORDER — OXYCODONE HYDROCHLORIDE 5 MG/1
5 TABLET ORAL EVERY 8 HOURS PRN
Qty: 90 TABLET | Refills: 0 | Status: SHIPPED | OUTPATIENT
Start: 2024-07-30

## 2024-07-30 RX ORDER — GABAPENTIN 300 MG/1
300 CAPSULE ORAL 3 TIMES DAILY
Qty: 90 CAPSULE | Refills: 11 | Status: SHIPPED | OUTPATIENT
Start: 2024-07-30 | End: 2025-07-30

## 2024-08-10 ENCOUNTER — APPOINTMENT (OUTPATIENT)
Dept: RADIOLOGY | Facility: HOSPITAL | Age: 74
DRG: 894 | End: 2024-08-10
Payer: COMMERCIAL

## 2024-08-10 ENCOUNTER — HOSPITAL ENCOUNTER (INPATIENT)
Facility: HOSPITAL | Age: 74
End: 2024-08-10
Admitting: INTERNAL MEDICINE
Payer: COMMERCIAL

## 2024-08-10 ENCOUNTER — APPOINTMENT (OUTPATIENT)
Dept: CARDIOLOGY | Facility: HOSPITAL | Age: 74
DRG: 894 | End: 2024-08-10
Payer: COMMERCIAL

## 2024-08-10 DIAGNOSIS — E87.20 LACTIC ACIDOSIS: ICD-10-CM

## 2024-08-10 DIAGNOSIS — N30.01 ACUTE CYSTITIS WITH HEMATURIA: Primary | ICD-10-CM

## 2024-08-10 DIAGNOSIS — A41.9 SEPSIS, DUE TO UNSPECIFIED ORGANISM, UNSPECIFIED WHETHER ACUTE ORGAN DYSFUNCTION PRESENT (MULTI): ICD-10-CM

## 2024-08-10 DIAGNOSIS — F10.10 ETOH ABUSE: ICD-10-CM

## 2024-08-10 PROBLEM — F10.929 ALCOHOL INTOXICATION (CMS-HCC): Status: ACTIVE | Noted: 2024-08-10

## 2024-08-10 PROBLEM — Z95.1 HX OF CABG: Status: ACTIVE | Noted: 2024-08-10

## 2024-08-10 LAB
ALBUMIN SERPL-MCNC: 4.6 G/DL (ref 3.5–5)
ALP BLD-CCNC: 118 U/L (ref 35–125)
ALT SERPL-CCNC: 18 U/L (ref 5–40)
AMMONIA PLAS-SCNC: 23 UMOL/L (ref 12–45)
AMPHETAMINES UR QL SCN>1000 NG/ML: NEGATIVE
ANION GAP SERPL CALC-SCNC: >19 MMOL/L
APAP SERPL-MCNC: <5 UG/ML
APPEARANCE UR: ABNORMAL
AST SERPL-CCNC: 22 U/L (ref 5–40)
BACTERIA #/AREA URNS AUTO: ABNORMAL /HPF
BARBITURATES UR QL SCN>300 NG/ML: NEGATIVE
BASOPHILS # BLD AUTO: 0.1 X10*3/UL (ref 0–0.1)
BASOPHILS NFR BLD AUTO: 0.8 %
BENZODIAZ UR QL SCN>300 NG/ML: NEGATIVE
BILIRUB SERPL-MCNC: 0.6 MG/DL (ref 0.1–1.2)
BILIRUB UR STRIP.AUTO-MCNC: NEGATIVE MG/DL
BUN SERPL-MCNC: 14 MG/DL (ref 8–25)
BZE UR QL SCN>300 NG/ML: NEGATIVE
CALCIUM SERPL-MCNC: 9.3 MG/DL (ref 8.5–10.4)
CANNABINOIDS UR QL SCN>50 NG/ML: NEGATIVE
CHLORIDE SERPL-SCNC: 104 MMOL/L (ref 97–107)
CO2 SERPL-SCNC: 13 MMOL/L (ref 24–31)
COLOR UR: ABNORMAL
CREAT SERPL-MCNC: 1 MG/DL (ref 0.4–1.6)
EGFRCR SERPLBLD CKD-EPI 2021: 79 ML/MIN/1.73M*2
EOSINOPHIL # BLD AUTO: 0.13 X10*3/UL (ref 0–0.4)
EOSINOPHIL NFR BLD AUTO: 1 %
ERYTHROCYTE [DISTWIDTH] IN BLOOD BY AUTOMATED COUNT: 15.3 % (ref 11.5–14.5)
ETHANOL SERPL-MCNC: 0.37 G/DL
FENTANYL+NORFENTANYL UR QL SCN: NEGATIVE
GLUCOSE BLD MANUAL STRIP-MCNC: 78 MG/DL (ref 74–99)
GLUCOSE SERPL-MCNC: 84 MG/DL (ref 65–99)
GLUCOSE UR STRIP.AUTO-MCNC: NORMAL MG/DL
HCT VFR BLD AUTO: 47.8 % (ref 41–52)
HGB BLD-MCNC: 15.4 G/DL (ref 13.5–17.5)
IMM GRANULOCYTES # BLD AUTO: 0.08 X10*3/UL (ref 0–0.5)
IMM GRANULOCYTES NFR BLD AUTO: 0.6 % (ref 0–0.9)
KETONES UR STRIP.AUTO-MCNC: ABNORMAL MG/DL
LACTATE BLDV-SCNC: 6.9 MMOL/L (ref 0.4–2)
LACTATE BLDV-SCNC: 8.8 MMOL/L (ref 0.4–2)
LEUKOCYTE ESTERASE UR QL STRIP.AUTO: ABNORMAL
LIPASE SERPL-CCNC: 47 U/L (ref 16–63)
LYMPHOCYTES # BLD AUTO: 2.18 X10*3/UL (ref 0.8–3)
LYMPHOCYTES NFR BLD AUTO: 16.7 %
MAGNESIUM SERPL-MCNC: 2.1 MG/DL (ref 1.6–3.1)
MCH RBC QN AUTO: 27.9 PG (ref 26–34)
MCHC RBC AUTO-ENTMCNC: 32.2 G/DL (ref 32–36)
MCV RBC AUTO: 87 FL (ref 80–100)
METHADONE UR QL SCN>300 NG/ML: NEGATIVE
MONOCYTES # BLD AUTO: 0.68 X10*3/UL (ref 0.05–0.8)
MONOCYTES NFR BLD AUTO: 5.2 %
MUCOUS THREADS #/AREA URNS AUTO: ABNORMAL /LPF
NEUTROPHILS # BLD AUTO: 9.87 X10*3/UL (ref 1.6–5.5)
NEUTROPHILS NFR BLD AUTO: 75.7 %
NITRITE UR QL STRIP.AUTO: ABNORMAL
NRBC BLD-RTO: 0 /100 WBCS (ref 0–0)
OPIATES UR QL SCN>300 NG/ML: NEGATIVE
OXYCODONE UR QL: NEGATIVE
PCP UR QL SCN>25 NG/ML: NEGATIVE
PH UR STRIP.AUTO: 6 [PH]
PHOSPHATE SERPL-MCNC: 3.4 MG/DL (ref 2.5–4.5)
PLATELET # BLD AUTO: 316 X10*3/UL (ref 150–450)
POTASSIUM SERPL-SCNC: 3.7 MMOL/L (ref 3.4–5.1)
PROT SERPL-MCNC: 8.2 G/DL (ref 5.9–7.9)
PROT UR STRIP.AUTO-MCNC: ABNORMAL MG/DL
RBC # BLD AUTO: 5.51 X10*6/UL (ref 4.5–5.9)
RBC # UR STRIP.AUTO: ABNORMAL /UL
RBC #/AREA URNS AUTO: >20 /HPF
SALICYLATES SERPL-MCNC: <0 MG/DL
SODIUM SERPL-SCNC: 143 MMOL/L (ref 133–145)
SP GR UR STRIP.AUTO: 1.01
TROPONIN T SERPL-MCNC: 23 NG/L
TROPONIN T SERPL-MCNC: 25 NG/L
TROPONIN T SERPL-MCNC: 26 NG/L
UROBILINOGEN UR STRIP.AUTO-MCNC: NORMAL MG/DL
WBC # BLD AUTO: 13 X10*3/UL (ref 4.4–11.3)
WBC #/AREA URNS AUTO: >50 /HPF
WBC CLUMPS #/AREA URNS AUTO: ABNORMAL /HPF

## 2024-08-10 PROCEDURE — 2060000001 HC INTERMEDIATE ICU ROOM DAILY

## 2024-08-10 PROCEDURE — 80307 DRUG TEST PRSMV CHEM ANLYZR: CPT

## 2024-08-10 PROCEDURE — 87040 BLOOD CULTURE FOR BACTERIA: CPT | Mod: WESLAB

## 2024-08-10 PROCEDURE — 85025 COMPLETE CBC W/AUTO DIFF WBC: CPT

## 2024-08-10 PROCEDURE — 82140 ASSAY OF AMMONIA: CPT

## 2024-08-10 PROCEDURE — 84100 ASSAY OF PHOSPHORUS: CPT

## 2024-08-10 PROCEDURE — 87086 URINE CULTURE/COLONY COUNT: CPT | Mod: WESLAB

## 2024-08-10 PROCEDURE — 83690 ASSAY OF LIPASE: CPT

## 2024-08-10 PROCEDURE — 2500000001 HC RX 250 WO HCPCS SELF ADMINISTERED DRUGS (ALT 637 FOR MEDICARE OP)

## 2024-08-10 PROCEDURE — 80143 DRUG ASSAY ACETAMINOPHEN: CPT

## 2024-08-10 PROCEDURE — 87075 CULTR BACTERIA EXCEPT BLOOD: CPT | Mod: WESLAB

## 2024-08-10 PROCEDURE — 81001 URINALYSIS AUTO W/SCOPE: CPT

## 2024-08-10 PROCEDURE — 93005 ELECTROCARDIOGRAM TRACING: CPT

## 2024-08-10 PROCEDURE — 71045 X-RAY EXAM CHEST 1 VIEW: CPT | Performed by: RADIOLOGY

## 2024-08-10 PROCEDURE — 2500000001 HC RX 250 WO HCPCS SELF ADMINISTERED DRUGS (ALT 637 FOR MEDICARE OP): Performed by: INTERNAL MEDICINE

## 2024-08-10 PROCEDURE — 71045 X-RAY EXAM CHEST 1 VIEW: CPT

## 2024-08-10 PROCEDURE — 36415 COLL VENOUS BLD VENIPUNCTURE: CPT

## 2024-08-10 PROCEDURE — 84484 ASSAY OF TROPONIN QUANT: CPT

## 2024-08-10 PROCEDURE — 2500000004 HC RX 250 GENERAL PHARMACY W/ HCPCS (ALT 636 FOR OP/ED)

## 2024-08-10 PROCEDURE — 70450 CT HEAD/BRAIN W/O DYE: CPT | Performed by: RADIOLOGY

## 2024-08-10 PROCEDURE — 83735 ASSAY OF MAGNESIUM: CPT

## 2024-08-10 PROCEDURE — 80053 COMPREHEN METABOLIC PANEL: CPT

## 2024-08-10 PROCEDURE — 82947 ASSAY GLUCOSE BLOOD QUANT: CPT

## 2024-08-10 PROCEDURE — 70450 CT HEAD/BRAIN W/O DYE: CPT

## 2024-08-10 PROCEDURE — 96375 TX/PRO/DX INJ NEW DRUG ADDON: CPT

## 2024-08-10 PROCEDURE — 93010 ELECTROCARDIOGRAM REPORT: CPT | Performed by: INTERNAL MEDICINE

## 2024-08-10 PROCEDURE — 83605 ASSAY OF LACTIC ACID: CPT

## 2024-08-10 PROCEDURE — 99291 CRITICAL CARE FIRST HOUR: CPT | Mod: 25

## 2024-08-10 PROCEDURE — 96361 HYDRATE IV INFUSION ADD-ON: CPT

## 2024-08-10 PROCEDURE — 96365 THER/PROPH/DIAG IV INF INIT: CPT

## 2024-08-10 PROCEDURE — 99222 1ST HOSP IP/OBS MODERATE 55: CPT | Performed by: INTERNAL MEDICINE

## 2024-08-10 RX ORDER — THIAMINE HYDROCHLORIDE 100 MG/ML
100 INJECTION, SOLUTION INTRAMUSCULAR; INTRAVENOUS DAILY
Status: DISPENSED | OUTPATIENT
Start: 2024-08-10 | End: 2024-08-13

## 2024-08-10 RX ORDER — OXYCODONE HYDROCHLORIDE 5 MG/1
5 TABLET ORAL EVERY 8 HOURS PRN
Status: DISPENSED | OUTPATIENT
Start: 2024-08-10

## 2024-08-10 RX ORDER — GABAPENTIN 300 MG/1
300 CAPSULE ORAL 3 TIMES DAILY
Status: DISPENSED | OUTPATIENT
Start: 2024-08-10

## 2024-08-10 RX ORDER — LORAZEPAM 2 MG/ML
2 INJECTION INTRAMUSCULAR EVERY 2 HOUR PRN
Status: DISPENSED | OUTPATIENT
Start: 2024-08-10

## 2024-08-10 RX ORDER — ENOXAPARIN SODIUM 100 MG/ML
40 INJECTION SUBCUTANEOUS
Status: DISPENSED | OUTPATIENT
Start: 2024-08-11

## 2024-08-10 RX ORDER — ONDANSETRON HYDROCHLORIDE 2 MG/ML
4 INJECTION, SOLUTION INTRAVENOUS ONCE
Status: COMPLETED | OUTPATIENT
Start: 2024-08-10 | End: 2024-08-10

## 2024-08-10 RX ORDER — LORAZEPAM 2 MG/ML
0.5 INJECTION INTRAMUSCULAR EVERY 2 HOUR PRN
Status: DISPENSED | OUTPATIENT
Start: 2024-08-10

## 2024-08-10 RX ORDER — LORAZEPAM 2 MG/ML
1 INJECTION INTRAMUSCULAR EVERY 2 HOUR PRN
Status: ACTIVE | OUTPATIENT
Start: 2024-08-10

## 2024-08-10 RX ORDER — VANCOMYCIN 2 G/400ML
2 INJECTION, SOLUTION INTRAVENOUS ONCE
Status: COMPLETED | OUTPATIENT
Start: 2024-08-10 | End: 2024-08-10

## 2024-08-10 RX ORDER — MULTIVIT-MIN/IRON FUM/FOLIC AC 7.5 MG-4
1 TABLET ORAL DAILY
Status: DISCONTINUED | OUTPATIENT
Start: 2024-08-10 | End: 2024-08-10

## 2024-08-10 RX ORDER — BUSPIRONE HYDROCHLORIDE 5 MG/1
5 TABLET ORAL 3 TIMES DAILY PRN
Status: ACTIVE | OUTPATIENT
Start: 2024-08-10

## 2024-08-10 RX ORDER — MULTIVIT-MIN/IRON FUM/FOLIC AC 7.5 MG-4
1 TABLET ORAL DAILY
Status: DISPENSED | OUTPATIENT
Start: 2024-08-10

## 2024-08-10 RX ORDER — FOLIC ACID 1 MG/1
1 TABLET ORAL DAILY
Status: DISPENSED | OUTPATIENT
Start: 2024-08-10

## 2024-08-10 RX ORDER — LANOLIN ALCOHOL/MO/W.PET/CERES
100 CREAM (GRAM) TOPICAL DAILY
Status: ACTIVE | OUTPATIENT
Start: 2024-08-13

## 2024-08-10 RX ORDER — THIAMINE HYDROCHLORIDE 100 MG/ML
100 INJECTION, SOLUTION INTRAMUSCULAR; INTRAVENOUS DAILY
Status: DISCONTINUED | OUTPATIENT
Start: 2024-08-10 | End: 2024-08-10

## 2024-08-10 RX ORDER — LORAZEPAM 1 MG/1
1 TABLET ORAL EVERY 2 HOUR PRN
Status: DISPENSED | OUTPATIENT
Start: 2024-08-10

## 2024-08-10 RX ORDER — CEFTRIAXONE 1 G/50ML
1 INJECTION, SOLUTION INTRAVENOUS EVERY 24 HOURS
Status: DISPENSED | OUTPATIENT
Start: 2024-08-11

## 2024-08-10 RX ORDER — LORAZEPAM 0.5 MG/1
0.5 TABLET ORAL EVERY 2 HOUR PRN
Status: ACTIVE | OUTPATIENT
Start: 2024-08-10

## 2024-08-10 RX ORDER — LORAZEPAM 1 MG/1
2 TABLET ORAL EVERY 2 HOUR PRN
Status: ACTIVE | OUTPATIENT
Start: 2024-08-10

## 2024-08-10 RX ORDER — FOLIC ACID 1 MG/1
1 TABLET ORAL DAILY
Status: DISCONTINUED | OUTPATIENT
Start: 2024-08-10 | End: 2024-08-10

## 2024-08-10 RX ADMIN — PIPERACILLIN SODIUM AND TAZOBACTAM SODIUM 4.5 G: 4; .5 INJECTION, SOLUTION INTRAVENOUS at 17:19

## 2024-08-10 RX ADMIN — OXYCODONE 5 MG: 5 TABLET ORAL at 20:40

## 2024-08-10 RX ADMIN — FOLIC ACID 1 MG: 1 TABLET ORAL at 17:26

## 2024-08-10 RX ADMIN — Medication 1 TABLET: at 17:26

## 2024-08-10 RX ADMIN — GABAPENTIN 300 MG: 300 CAPSULE ORAL at 20:39

## 2024-08-10 RX ADMIN — VANCOMYCIN 2 G: 2 INJECTION, SOLUTION INTRAVENOUS at 18:48

## 2024-08-10 RX ADMIN — LORAZEPAM 2 MG: 2 INJECTION INTRAMUSCULAR; INTRAVENOUS at 23:13

## 2024-08-10 RX ADMIN — THIAMINE HYDROCHLORIDE 100 MG: 100 INJECTION, SOLUTION INTRAMUSCULAR; INTRAVENOUS at 17:24

## 2024-08-10 RX ADMIN — SODIUM CHLORIDE 2190 ML: 900 INJECTION, SOLUTION INTRAVENOUS at 15:48

## 2024-08-10 RX ADMIN — ONDANSETRON 4 MG: 2 INJECTION INTRAMUSCULAR; INTRAVENOUS at 15:10

## 2024-08-10 RX ADMIN — LORAZEPAM 2 MG: 2 INJECTION INTRAMUSCULAR; INTRAVENOUS at 21:30

## 2024-08-10 RX ADMIN — LORAZEPAM 0.5 MG: 2 INJECTION INTRAMUSCULAR; INTRAVENOUS at 17:52

## 2024-08-10 RX ADMIN — SODIUM CHLORIDE 1000 ML: 900 INJECTION, SOLUTION INTRAVENOUS at 15:09

## 2024-08-10 ASSESSMENT — LIFESTYLE VARIABLES
TOTAL SCORE: 6
AUDITORY DISTURBANCES: NOT PRESENT
TREMOR: 2
NAUSEA AND VOMITING: NO NAUSEA AND NO VOMITING
AGITATION: MODERATELY FIDGETY AND RESTLESS
ANXIETY: 2
AGITATION: 2
AUDITORY DISTURBANCES: NOT PRESENT
VISUAL DISTURBANCES: NOT PRESENT
TOTAL SCORE: 24
HEADACHE, FULLNESS IN HEAD: VERY SEVERE
PULSE: 128
ORIENTATION AND CLOUDING OF SENSORIUM: ORIENTED AND CAN DO SERIAL ADDITIONS
VISUAL DISTURBANCES: NOT PRESENT
PAROXYSMAL SWEATS: BARELY PERCEPTIBLE SWEATING, PALMS MOIST
BLOOD PRESSURE: 133/100
ORIENTATION AND CLOUDING OF SENSORIUM: ORIENTED AND CAN DO SERIAL ADDITIONS
PAROXYSMAL SWEATS: BARELY PERCEPTIBLE SWEATING, PALMS MOIST
TREMOR: NOT VISIBLE, BUT CAN BE FELT FINGERTIP TO FINGERTIP
PULSE: 113
PULSE: 115
AGITATION: SOMEWHAT MORE THAN NORMAL ACTIVITY
HEADACHE, FULLNESS IN HEAD: EXTREMELY SEVERE
PAROXYSMAL SWEATS: NO SWEAT VISIBLE
VISUAL DISTURBANCES: NOT PRESENT
ORIENTATION AND CLOUDING OF SENSORIUM: ORIENTED AND CAN DO SERIAL ADDITIONS
AUDITORY DISTURBANCES: NOT PRESENT
TOTAL SCORE: 14
ANXIETY: 5
NAUSEA AND VOMITING: NO NAUSEA AND NO VOMITING
TREMOR: NOT VISIBLE, BUT CAN BE FELT FINGERTIP TO FINGERTIP
TACTILE DISTURBANCES: MODERATE ITCHING, PINS AND NEEDLES, BURNING OR NUMBNESS
HEADACHE, FULLNESS IN HEAD: VERY MILD
BLOOD PRESSURE: 159/105
ANXIETY: 6
NAUSEA AND VOMITING: MILD NAUSEA WITH NO VOMITING

## 2024-08-10 ASSESSMENT — ENCOUNTER SYMPTOMS
FEVER: 0
CHILLS: 0
APPETITE CHANGE: 0
SPEECH DIFFICULTY: 0
ACTIVITY CHANGE: 0
VOMITING: 0
TROUBLE SWALLOWING: 0
DECREASED CONCENTRATION: 0
BRUISES/BLEEDS EASILY: 0
TREMORS: 0
ARTHRALGIAS: 0
DYSPHORIC MOOD: 0
UNEXPECTED WEIGHT CHANGE: 0
NERVOUS/ANXIOUS: 0
BACK PAIN: 0
HEMATURIA: 0
CONSTIPATION: 0
EYE ITCHING: 0
FLANK PAIN: 0
CHEST TIGHTNESS: 0
VOICE CHANGE: 0
APNEA: 0
DIARRHEA: 0
PHOTOPHOBIA: 0
ABDOMINAL PAIN: 0
HYPERACTIVE: 0
NUMBNESS: 0
FATIGUE: 0
DIAPHORESIS: 0
SINUS PAIN: 0
CHOKING: 0
MYALGIAS: 0
DIFFICULTY URINATING: 0
DYSURIA: 0
NAUSEA: 0
AGITATION: 0
COUGH: 0
FREQUENCY: 0
POLYDIPSIA: 0
EYE PAIN: 0
HALLUCINATIONS: 0
NECK STIFFNESS: 0
ABDOMINAL DISTENTION: 0
SEIZURES: 0
PALPITATIONS: 0
FACIAL ASYMMETRY: 0
BLOOD IN STOOL: 0
SINUS PRESSURE: 0
NECK PAIN: 0
HEADACHES: 0
EYE REDNESS: 0
EYE DISCHARGE: 0
SLEEP DISTURBANCE: 0
COLOR CHANGE: 0
FACIAL SWELLING: 0
WHEEZING: 0
SHORTNESS OF BREATH: 0
DIZZINESS: 0
ADENOPATHY: 0
RECTAL PAIN: 0
JOINT SWELLING: 0
ANAL BLEEDING: 0
SORE THROAT: 0
WOUND: 0
RHINORRHEA: 0
LIGHT-HEADEDNESS: 0
CONFUSION: 0
WEAKNESS: 0
POLYPHAGIA: 0
STRIDOR: 0

## 2024-08-10 ASSESSMENT — PAIN SCALES - GENERAL
PAINLEVEL_OUTOF10: 0 - NO PAIN
PAINLEVEL_OUTOF10: 9
PAINLEVEL_OUTOF10: 5 - MODERATE PAIN
PAINLEVEL_OUTOF10: 10 - WORST POSSIBLE PAIN

## 2024-08-10 ASSESSMENT — PAIN DESCRIPTION - ORIENTATION: ORIENTATION: RIGHT

## 2024-08-10 ASSESSMENT — PAIN - FUNCTIONAL ASSESSMENT
PAIN_FUNCTIONAL_ASSESSMENT: 0-10

## 2024-08-10 ASSESSMENT — COLUMBIA-SUICIDE SEVERITY RATING SCALE - C-SSRS
1. IN THE PAST MONTH, HAVE YOU WISHED YOU WERE DEAD OR WISHED YOU COULD GO TO SLEEP AND NOT WAKE UP?: NO
6. HAVE YOU EVER DONE ANYTHING, STARTED TO DO ANYTHING, OR PREPARED TO DO ANYTHING TO END YOUR LIFE?: NO
2. HAVE YOU ACTUALLY HAD ANY THOUGHTS OF KILLING YOURSELF?: NO

## 2024-08-10 ASSESSMENT — PAIN DESCRIPTION - DESCRIPTORS
DESCRIPTORS: PINS AND NEEDLES
DESCRIPTORS: ACHING

## 2024-08-10 ASSESSMENT — PAIN DESCRIPTION - LOCATION: LOCATION: LEG

## 2024-08-10 NOTE — ED TRIAGE NOTES
Unresponsive but breathing per wife, 1/5th of vodka daily for 3 days, poss ETOH, responsive for ems to painful stimuli.

## 2024-08-10 NOTE — ED PROVIDER NOTES
"HPI   Chief Complaint   Patient presents with    Alcohol Intoxication       Patient is a 73-year-old male with past medical history of alcohol abuse presenting via EMS from home with alcohol intoxication.  Report is that the patient drinks 1/5 of vodka a day for the last several days.  His wheelchair.  She states that he has not been feeling well with his amputation.  Patient is unwilling to answer questions but is alert upon arrival.  Does endorse \"heart pain \".  Denies SI.  Will not further elaborate on his alcohol use.  ROS is limited due to patient's current intoxication.              Patient History   Past Medical History:   Diagnosis Date    Fracture of unspecified metatarsal bone(s), unspecified foot, initial encounter for closed fracture 01/18/2020    Fracture of metatarsal bone, closed    Other specified disorders of bone, lower leg 10/10/2019    Tibial pain    Pain in right foot 10/14/2019    Right foot pain    Pain, unspecified 04/23/2021    Pain    Sleep apnea     Unspecified fracture of shaft of right tibia, initial encounter for closed fracture 09/23/2021    Fracture of right tibia     Past Surgical History:   Procedure Laterality Date    AMPUTATION      JOINT REPLACEMENT      KNEE ARTHROPLASTY      TUMOR EXCISION Left     left hand    WRIST FUSION Right      No family history on file.  Social History     Tobacco Use    Smoking status: Former     Types: Cigarettes    Smokeless tobacco: Never   Substance Use Topics    Alcohol use: Yes     Comment: rarely    Drug use: Yes     Types: Marijuana       Physical Exam   ED Triage Vitals [08/10/24 1455]   Temperature Heart Rate Respirations BP   36.6 °C (97.9 °F) (!) 117 18 (!) 148/95      Pulse Ox Temp Source Heart Rate Source Patient Position   95 % Temporal -- Lying      BP Location FiO2 (%)     Left arm --       Physical Exam  Constitutional:       Comments: Awake but drowsy, intoxicated appearing   HENT:      Head: Normocephalic and atraumatic.      Nose: " Nose normal.      Mouth/Throat:      Mouth: Mucous membranes are moist.      Pharynx: Oropharynx is clear.   Eyes:      Extraocular Movements: Extraocular movements intact.      Pupils: Pupils are equal, round, and reactive to light.   Cardiovascular:      Rate and Rhythm: Regular rhythm. Tachycardia present.   Pulmonary:      Effort: Pulmonary effort is normal.      Breath sounds: Normal breath sounds.   Abdominal:      General: Abdomen is flat.      Palpations: Abdomen is soft.   Musculoskeletal:         General: Normal range of motion.      Cervical back: Normal range of motion and neck supple.   Skin:     General: Skin is warm and dry.      Capillary Refill: Capillary refill takes less than 2 seconds.   Neurological:      General: No focal deficit present.      Comments: Intoxicated appearing   Psychiatric:      Comments: Flat affect, intoxicated           ED Course & MDM   ED Course as of 08/10/24 1706   Sat Aug 10, 2024   1458 EKG interpreted by myself independently, EKG shows sinus tachycardia, rate of 116 bpm, WV interval 144, QRS 94, , QTc 469, patient has normal axis, normal R wave progression, no significant ST elevations or depressions, negative for acute MI. [ARLEEN]   1545 Lactate elevated at 8.8, 30cc/kg fluids ideal body weight ordered. [AR]   1648 I performed a sepsis reperfusion exam on [facility] on 8/10/2024 4:49 PM    A targeted fluid bolus of 30 cc/kg ideal body was given  José Muller DO   [ARLEEN]   1649 Due to patient's leukocytosis, elevated lactate, positive UTI findings, IV antibiotics ordered. [AR]   1704 I spoke with admitting provider, Dr. Winston.  After discussion, patient will be admitted to the stepdown unit for further management of acute cystitis with hematuria and elevated lactate. [AR]      ED Course User Index  [AR] Desmond Medina PA-C  [ARLEEN] oJsé Muller DO         Diagnoses as of 08/10/24 1706   Acute cystitis with hematuria   Lactic acidosis   Sepsis, due to  unspecified organism, unspecified whether acute organ dysfunction present (Multi)   ETOH abuse                 No data recorded     Dike Coma Scale Score: 14 (08/10/24 1520 : Lauren Dudley RN)                           Medical Decision Making  Patient is a 73-year-old male with past medical history of alcohol abuse presenting via EMS from home with alcohol intoxication.  Lab work, urine, imaging ordered.  Conditions considered include but are not limited to: Alcohol abuse, polysubstance abuse, SI, depression.  Patient tachycardic upon arrival.  Initial 1 L fluids ordered.    I saw this patient conjunction with Dr. Muller.  CBC does show mild leukocytosis with WBCs of 13.0 but is without signs of anemia.  Lactate elevated at 8.8.  30 cc/kg ideal body weight fluids ordered.  Phosphorus within normal limits.  Lipase within normal limits.  Initial troponin elevated at 25.  Magnesium within normal limits.  Ammonia within normal limits.  Acute toxicology panel shows ethanol level elevated at 0.368.  CT head without acute intracranial pathology.  Chest x-ray without acute cardiopulmonary process.  CMP without significant electrolyte abnormality or renal impairment but does show positive anion gap likely related to alcohol use.  Urine drug screen is negative.  UA with micro is positive for UTI, broad-spectrum antibiotics ordered.    I spoke with admitting provider, Dr. Winston.  After discussion, patient will be admitted for further management of sepsis due to unknown origin, cystitis, elevated lactic.  As I deemed necessary from the patient's history, physical, laboratory and imaging findings as well as ED course, I considered the above listed diagnoses.    Upon my evaluation, this patient had a high probability of imminent or life threatening deterioration due to sepsis, cystitis, which required my direct attention, intervention, and personal management.    I personally provided 32 minutes to nonconcurrent critical care  time exclusive of time spent on separately billable procedures.  Time includes review of laboratory data, radiology results, discussion with consultants, and monitoring for potential decompensation.  Interventions were performed as documented above.    Portions of this note made with Dragon software, please be mindful of potential grammatical errors.        Medications   sodium chloride 0.9 % bolus 2,190 mL (2,190 mL intravenous New Bag 8/10/24 1548)   piperacillin-tazobactam (Zosyn) 4.5 g in dextrose (iso)  mL (has no administration in time range)   vancomycin (Xellia) 2 g in diluent combination  mL (has no administration in time range)   folic acid (Folvite) tablet 1 mg (has no administration in time range)   multivitamin with minerals 1 tablet (has no administration in time range)   thiamine (Vitamin B1) injection 100 mg (has no administration in time range)   LORazepam (Ativan) injection 0.5 mg (has no administration in time range)     Or   LORazepam (Ativan) injection 1 mg (has no administration in time range)     Or   LORazepam (Ativan) injection 2 mg (has no administration in time range)   sodium chloride 0.9 % bolus 1,000 mL (0 mL intravenous Stopped 8/10/24 1609)   ondansetron (Zofran) injection 4 mg (4 mg intravenous Given 8/10/24 1510)         Labs Reviewed   URINALYSIS WITH REFLEX CULTURE AND MICROSCOPIC - Abnormal       Result Value    Color, Urine Light-Yellow      Appearance, Urine Turbid (*)     Specific Gravity, Urine 1.015      pH, Urine 6.0      Protein, Urine 50 (1+) (*)     Glucose, Urine Normal      Blood, Urine 0.5 (2+) (*)     Ketones, Urine TRACE (*)     Bilirubin, Urine NEGATIVE      Urobilinogen, Urine Normal      Nitrite, Urine 1+ (*)     Leukocyte Esterase, Urine 500 Aracelis/µL (*)    SERIAL TROPONIN, INITIAL ([facility]) - Abnormal    Troponin T, High Sensitivity 25 (*)    CBC WITH AUTO DIFFERENTIAL - Abnormal    WBC 13.0 (*)     nRBC 0.0      RBC 5.51      Hemoglobin 15.4       Hematocrit 47.8      MCV 87      MCH 27.9      MCHC 32.2      RDW 15.3 (*)     Platelets 316      Neutrophils % 75.7      Immature Granulocytes %, Automated 0.6      Lymphocytes % 16.7      Monocytes % 5.2      Eosinophils % 1.0      Basophils % 0.8      Neutrophils Absolute 9.87 (*)     Immature Granulocytes Absolute, Automated 0.08      Lymphocytes Absolute 2.18      Monocytes Absolute 0.68      Eosinophils Absolute 0.13      Basophils Absolute 0.10     COMPREHENSIVE METABOLIC PANEL - Abnormal    Glucose 84      Sodium 143      Potassium 3.7      Chloride 104      Bicarbonate 13 (*)     Urea Nitrogen 14      Creatinine 1.00      eGFR 79      Calcium 9.3      Albumin 4.6      Alkaline Phosphatase 118      Total Protein 8.2 (*)     AST 22      Bilirubin, Total 0.6      ALT 18      Anion Gap >19 (*)    BLOOD GAS LACTIC ACID, VENOUS - Abnormal    POCT Lactate, Venous 8.8 (*)    ACUTE TOXICOLOGY PANEL, BLOOD - Abnormal    Acetaminophen <5.0      Salicylate  <0      Alcohol 0.368 (*)    MICROSCOPIC ONLY, URINE - Abnormal    WBC, Urine >50 (*)     WBC Clumps, Urine FEW      RBC, Urine >20 (*)     Bacteria, Urine 1+ (*)     Mucus, Urine FEW     LIPASE - Normal    Lipase 47     DRUG SCREEN,URINE - Normal    Amphetamine Screen, Urine Negative      Barbiturate Screen, Urine Negative      Benzodiazepines Screen, Urine Negative      Cannabinoid Screen, Urine Negative      Cocaine Metabolite Screen, Urine Negative      Fentanyl Screen, Urine Negative      Methadone Screen, Urine Negative      Opiate Screen, Urine Negative      Oxycodone Screen, Urine Negative      PCP Screen, Urine Negative      Narrative:     These toxicological screening tests provide unconfirmed qualitative measurements to aid in treatment and diagnosis in cases of drug use or overdose. This test is used only for medical purposes. A positive result does not indicate or measure intoxication. For specific test performance or pathologist consultation, please  contact the Laboratory.    The following threshold concentrations are used for these analyses.Values at or above the threshold concentration are reported as positive. Values below the threshold are reported as negative.    Drug /Screening Threshold                                                                                                 THC/CANNABINOIDS................50 ng/ml  METHADONE......................300 ng/ml  COCAINE METABOLITES............300 ng/ml  BENZODIAZEPINE.................300 ng/ml  PCP.............................25 ng/ml  OPIATE.........................300 ng/ml  AMPHETAMINE/ECSTASY...........1000 ng/ml  BARBITURATE....................200 ng/ml  OXYCODONE......................100 ng/ml  FENTANYL.........................5 ng/ml       MAGNESIUM - Normal    Magnesium 2.10     PHOSPHORUS - Normal    Phosphorus 3.4     AMMONIA - Normal    Ammonia 23     POCT GLUCOSE - Normal    POCT Glucose 78     BLOOD CULTURE   BLOOD CULTURE   URINE CULTURE   URINALYSIS WITH REFLEX CULTURE AND MICROSCOPIC    Narrative:     The following orders were created for panel order Urinalysis with Reflex Culture and Microscopic.  Procedure                               Abnormality         Status                     ---------                               -----------         ------                     Urinalysis with Reflex C...[[id number]]  Abnormal            Final result               Extra Urine Gray Tube[[id number]]                            In process                   Please view results for these tests on the individual orders.   EXTRA URINE GRAY TUBE   TROPONIN T SERIES, HIGH SENSITIVITY (0, 2 HR, 6 HR)    Narrative:     The following orders were created for panel order Troponin T Series, High Sensitivity (0, 2HR, 6HR).  Procedure                               Abnormality         Status                     ---------                               -----------         ------                     Serial Troponin,  Initial...[[id number]]  Abnormal            Final result               Serial Troponin, 2 Hour ...[[id number]]                                                 Serial Troponin, 6 Hour ...[[id number]]                                                   Please view results for these tests on the individual orders.   SERIAL TROPONIN,  2 HOUR ([facility])   BLOOD GAS LACTIC ACID, VENOUS   POCT GLUCOSE METER       CT head wo IV contrast   Final Result   1. No acute intracranial abnormality        2. Mild parenchymal volume loss             MACRO:   None        Signed by: Sylvain Christina 8/10/2024 3:35 PM   Dictation workstation:   [id number]      XR chest 1 view   Final Result   1. Lung fields hypoinflated without acute process.        Signed by: Sylvain Christina 8/10/2024 3:33 PM   Dictation workstation:   [id number]            Procedure  Procedures     Desmond Medina PA-C  08/10/24 1705

## 2024-08-10 NOTE — ED PROVIDER NOTES
THIS IS MY NAVEED SUPERVISORY AND SHARED VISIT NOTE:    I personally saw the patient and made/approved the management plan and take responsibility for the patient management.    History: Patient is a 73-year-old male who presents from home with a chief complaint of alcohol intoxication.  History initially gathered from patient's EMS crew, apparently patient has been somnolent and minimally responsive.  His wife states he has been drinking a significant amount recently.  He has been drinking around 1/5 of vodka daily for the last 3 days.  Patient responds to painful stimuli, follows directions at this time.    Exam: Patient is in no acute distress, patient follows commands, wakes to painful stimuli, somnolent.  Patient cardiac exam reveals tachycardia, no rubs gallops or murmurs, respiratory exam clear to auscultation bilaterally, no rales rhonchi or wheezes, abdomen soft, nontender, nondistended, patient has no other acute complaints at this time.    MDM: Patient seen and evaluated at bedside, patient is in no acute distress.  I will order a CBC, CMP, ethanol, drug screen, tox screen, CT head, x-ray chest, phosphorus, magnesium, troponin, BNP. Differential diagnosis includes but is not limited to alcohol intoxication, intercranial hemorrhage, tight abnormality, substance abuse.  Patient CBC shows leukocytosis 13.0, patient's chemistry shows bicarb 13, patient's initial lactic acid 8.8, urinalysis shows 1+ bacteria, urinalysis positive for UTI, alcohol 0.368 initial troponin 25, ammonia 23 magnesium 2.1, patient's head CT and x-ray chest as below, due to patient elevated lactic acid, SIRS criteria, and source of infection with UTI, patient was given 30 cc/kg bolus, as well as broad-spectrum antibiotic coverage.  Patient be admitted to the general medicine team for further evaluation and treatment.  Please see NAVEED note for further details  UTI, sepsis,  CT head wo IV contrast   Final Result   1. No acute intracranial  abnormality        2. Mild parenchymal volume loss             MACRO:   None        Signed by: Sylvain Christina 8/10/2024 3:35 PM   Dictation workstation:   [id number]      XR chest 1 view   Final Result   1. Lung fields hypoinflated without acute process.        Signed by: Sylvain Christina 8/10/2024 3:33 PM   Dictation workstation:   [id number]        Results for orders placed or performed during the hospital encounter of 08/10/24   Lipase   Result Value Ref Range    Lipase 47 16 - 63 U/L   Drug Screen, Urine   Result Value Ref Range    Amphetamine Screen, Urine Negative      Barbiturate Screen, Urine Negative      Benzodiazepines Screen, Urine Negative      Cannabinoid Screen, Urine Negative      Cocaine Metabolite Screen, Urine Negative      Fentanyl Screen, Urine Negative       Methadone Screen, Urine Negative      Opiate Screen, Urine Negative      Oxycodone Screen, Urine Negative      PCP Screen, Urine Negative     Urinalysis with Reflex Culture and Microscopic   Result Value Ref Range    Color, Urine Light-Yellow Light-Yellow, Yellow, Dark-Yellow    Appearance, Urine Turbid (N) Clear    Specific Gravity, Urine 1.015 1.005 - 1.035    pH, Urine 6.0 5.0, 5.5, 6.0, 6.5, 7.0, 7.5, 8.0    Protein, Urine 50 (1+) (A) NEGATIVE, 10 (TRACE), 20 (TRACE) mg/dL    Glucose, Urine Normal Normal mg/dL    Blood, Urine 0.5 (2+) (A) NEGATIVE    Ketones, Urine TRACE (A) NEGATIVE mg/dL    Bilirubin, Urine NEGATIVE NEGATIVE    Urobilinogen, Urine Normal Normal mg/dL    Nitrite, Urine 1+ (A) NEGATIVE    Leukocyte Esterase, Urine 500 Aracelis/µL (A) NEGATIVE   Serial Troponin, Initial ([facility])   Result Value Ref Range    Troponin T, High Sensitivity 25 (HH) <=14 ng/L   Magnesium   Result Value Ref Range    Magnesium 2.10 1.60 - 3.10 mg/dL   Phosphorus   Result Value Ref Range    Phosphorus 3.4 2.5 - 4.5 mg/dL   CBC and Auto Differential   Result Value Ref Range    WBC 13.0 (H) 4.4 - 11.3 x10*3/uL    nRBC 0.0 0.0 - 0.0 /100 WBCs    RBC  5.51 4.50 - 5.90 x10*6/uL    Hemoglobin 15.4 13.5 - 17.5 g/dL    Hematocrit 47.8 41.0 - 52.0 %    MCV 87 80 - 100 fL    MCH 27.9 26.0 - 34.0 pg    MCHC 32.2 32.0 - 36.0 g/dL    RDW 15.3 (H) 11.5 - 14.5 %    Platelets 316 150 - 450 x10*3/uL    Neutrophils % 75.7 40.0 - 80.0 %    Immature Granulocytes %, Automated 0.6 0.0 - 0.9 %    Lymphocytes % 16.7 13.0 - 44.0 %    Monocytes % 5.2 2.0 - 10.0 %    Eosinophils % 1.0 0.0 - 6.0 %    Basophils % 0.8 0.0 - 2.0 %    Neutrophils Absolute 9.87 (H) 1.60 - 5.50 x10*3/uL    Immature Granulocytes Absolute, Automated 0.08 0.00 - 0.50 x10*3/uL    Lymphocytes Absolute 2.18 0.80 - 3.00 x10*3/uL    Monocytes Absolute 0.68 0.05 - 0.80 x10*3/uL    Eosinophils Absolute 0.13 0.00 - 0.40 x10*3/uL    Basophils Absolute 0.10 0.00 - 0.10 x10*3/uL   Comprehensive metabolic panel   Result Value Ref Range    Glucose 84 65 - 99 mg/dL    Sodium 143 133 - 145 mmol/L    Potassium 3.7 3.4 - 5.1 mmol/L    Chloride 104 97 - 107 mmol/L    Bicarbonate 13 (L) 24 - 31 mmol/L    Urea Nitrogen 14 8 - 25 mg/dL    Creatinine 1.00 0.40 - 1.60 mg/dL    eGFR 79 >60 mL/min/1.73m*2    Calcium 9.3 8.5 - 10.4 mg/dL    Albumin 4.6 3.5 - 5.0 g/dL    Alkaline Phosphatase 118 35 - 125 U/L    Total Protein 8.2 (H) 5.9 - 7.9 g/dL    AST 22 5 - 40 U/L    Bilirubin, Total 0.6 0.1 - 1.2 mg/dL    ALT 18 5 - 40 U/L    Anion Gap >19 (H) <=19 mmol/L   Blood Gas Lactic Acid, Venous   Result Value Ref Range    POCT Lactate, Venous 8.8 (HH) 0.4 - 2.0 mmol/L   Ammonia   Result Value Ref Range    Ammonia 23 12 - 45 umol/L   Acute Toxicology Panel, Blood   Result Value Ref Range    Acetaminophen <5.0 15.0 - 30.0 ug/mL    Salicylate  <0 3 - 25 mg/dL    Alcohol 0.368 () 0.000 - 0.010 g/dL   Microscopic Only, Urine   Result Value Ref Range    WBC, Urine >50 (A) 1-5, NONE /HPF    WBC Clumps, Urine FEW Reference range not established. /HPF    RBC, Urine >20 (A) NONE, 1-2, 3-5 /HPF    Bacteria, Urine 1+ (A) NONE SEEN /HPF    Mucus, Urine  FEW Reference range not established. /LPF   POCT GLUCOSE   Result Value Ref Range    POCT Glucose 78 74 - 99 mg/dL         Sections of this report were created using voice-to-text technology and may contain errors in translation    José Muller DO  Emergency Medicine       José Muller DO  08/10/24 [id number]

## 2024-08-11 VITALS
DIASTOLIC BLOOD PRESSURE: 98 MMHG | RESPIRATION RATE: 18 BRPM | SYSTOLIC BLOOD PRESSURE: 179 MMHG | HEIGHT: 71 IN | WEIGHT: 201.94 LBS | BODY MASS INDEX: 28.27 KG/M2 | HEART RATE: 115 BPM | OXYGEN SATURATION: 99 % | TEMPERATURE: 97.7 F

## 2024-08-11 PROBLEM — R00.0 TACHYCARDIA: Status: ACTIVE | Noted: 2024-08-11

## 2024-08-11 PROBLEM — R07.9 CHEST PAIN: Status: ACTIVE | Noted: 2024-08-11

## 2024-08-11 LAB
ALBUMIN SERPL-MCNC: 3.7 G/DL (ref 3.5–5)
ALP BLD-CCNC: 90 U/L (ref 35–125)
ALT SERPL-CCNC: 13 U/L (ref 5–40)
ANION GAP SERPL CALC-SCNC: 15 MMOL/L
AST SERPL-CCNC: 16 U/L (ref 5–40)
BACTERIA BLD CULT: NORMAL
BACTERIA BLD CULT: NORMAL
BILIRUB SERPL-MCNC: 0.7 MG/DL (ref 0.1–1.2)
BUN SERPL-MCNC: 13 MG/DL (ref 8–25)
CALCIUM SERPL-MCNC: 8 MG/DL (ref 8.5–10.4)
CHLORIDE SERPL-SCNC: 104 MMOL/L (ref 97–107)
CO2 SERPL-SCNC: 17 MMOL/L (ref 24–31)
CREAT SERPL-MCNC: 0.9 MG/DL (ref 0.4–1.6)
EGFRCR SERPLBLD CKD-EPI 2021: 90 ML/MIN/1.73M*2
ERYTHROCYTE [DISTWIDTH] IN BLOOD BY AUTOMATED COUNT: 15.3 % (ref 11.5–14.5)
GLUCOSE SERPL-MCNC: 142 MG/DL (ref 65–99)
HCT VFR BLD AUTO: 39.6 % (ref 41–52)
HGB BLD-MCNC: 13.1 G/DL (ref 13.5–17.5)
HOLD SPECIMEN: NORMAL
MCH RBC QN AUTO: 28.1 PG (ref 26–34)
MCHC RBC AUTO-ENTMCNC: 33.1 G/DL (ref 32–36)
MCV RBC AUTO: 85 FL (ref 80–100)
NRBC BLD-RTO: 0 /100 WBCS (ref 0–0)
PLATELET # BLD AUTO: 221 X10*3/UL (ref 150–450)
POTASSIUM SERPL-SCNC: 3.2 MMOL/L (ref 3.4–5.1)
PROT SERPL-MCNC: 6.3 G/DL (ref 5.9–7.9)
RBC # BLD AUTO: 4.67 X10*6/UL (ref 4.5–5.9)
SODIUM SERPL-SCNC: 136 MMOL/L (ref 133–145)
TROPONIN T SERPL-MCNC: 21 NG/L
WBC # BLD AUTO: 12.3 X10*3/UL (ref 4.4–11.3)

## 2024-08-11 PROCEDURE — 36415 COLL VENOUS BLD VENIPUNCTURE: CPT | Performed by: INTERNAL MEDICINE

## 2024-08-11 PROCEDURE — 2500000004 HC RX 250 GENERAL PHARMACY W/ HCPCS (ALT 636 FOR OP/ED): Performed by: INTERNAL MEDICINE

## 2024-08-11 PROCEDURE — 2500000001 HC RX 250 WO HCPCS SELF ADMINISTERED DRUGS (ALT 637 FOR MEDICARE OP): Performed by: INTERNAL MEDICINE

## 2024-08-11 PROCEDURE — 93010 ELECTROCARDIOGRAM REPORT: CPT | Performed by: INTERNAL MEDICINE

## 2024-08-11 PROCEDURE — 2060000001 HC INTERMEDIATE ICU ROOM DAILY

## 2024-08-11 PROCEDURE — 2500000001 HC RX 250 WO HCPCS SELF ADMINISTERED DRUGS (ALT 637 FOR MEDICARE OP)

## 2024-08-11 PROCEDURE — 99233 SBSQ HOSP IP/OBS HIGH 50: CPT | Performed by: INTERNAL MEDICINE

## 2024-08-11 PROCEDURE — 2500000004 HC RX 250 GENERAL PHARMACY W/ HCPCS (ALT 636 FOR OP/ED)

## 2024-08-11 PROCEDURE — 85027 COMPLETE CBC AUTOMATED: CPT | Performed by: INTERNAL MEDICINE

## 2024-08-11 PROCEDURE — 84075 ASSAY ALKALINE PHOSPHATASE: CPT | Performed by: INTERNAL MEDICINE

## 2024-08-11 PROCEDURE — 84484 ASSAY OF TROPONIN QUANT: CPT | Performed by: INTERNAL MEDICINE

## 2024-08-11 RX ORDER — PREGABALIN 25 MG/1
25 CAPSULE ORAL 3 TIMES DAILY
Status: DISCONTINUED | OUTPATIENT
Start: 2024-08-11 | End: 2024-08-11

## 2024-08-11 RX ORDER — METOPROLOL SUCCINATE 25 MG/1
25 TABLET, EXTENDED RELEASE ORAL EVERY 12 HOURS
Status: DISPENSED | OUTPATIENT
Start: 2024-08-11

## 2024-08-11 RX ADMIN — GABAPENTIN 300 MG: 300 CAPSULE ORAL at 15:17

## 2024-08-11 RX ADMIN — GABAPENTIN 300 MG: 300 CAPSULE ORAL at 08:02

## 2024-08-11 RX ADMIN — OXYCODONE 5 MG: 5 TABLET ORAL at 01:15

## 2024-08-11 RX ADMIN — ENOXAPARIN SODIUM 40 MG: 40 INJECTION SUBCUTANEOUS at 08:01

## 2024-08-11 RX ADMIN — GABAPENTIN 300 MG: 300 CAPSULE ORAL at 20:21

## 2024-08-11 RX ADMIN — FOLIC ACID 1 MG: 1 TABLET ORAL at 08:02

## 2024-08-11 RX ADMIN — CEFTRIAXONE SODIUM 1 G: 1 INJECTION, SOLUTION INTRAVENOUS at 01:51

## 2024-08-11 RX ADMIN — LORAZEPAM 2 MG: 2 INJECTION INTRAMUSCULAR; INTRAVENOUS at 15:23

## 2024-08-11 RX ADMIN — OXYCODONE 5 MG: 5 TABLET ORAL at 11:53

## 2024-08-11 RX ADMIN — OXYCODONE 5 MG: 5 TABLET ORAL at 20:26

## 2024-08-11 RX ADMIN — Medication 1 TABLET: at 08:02

## 2024-08-11 RX ADMIN — METOPROLOL SUCCINATE 25 MG: 25 TABLET, EXTENDED RELEASE ORAL at 21:57

## 2024-08-11 RX ADMIN — LORAZEPAM 1 MG: 1 TABLET ORAL at 12:11

## 2024-08-11 RX ADMIN — THIAMINE HYDROCHLORIDE 100 MG: 100 INJECTION, SOLUTION INTRAMUSCULAR; INTRAVENOUS at 08:02

## 2024-08-11 RX ADMIN — LORAZEPAM 2 MG: 2 INJECTION INTRAMUSCULAR; INTRAVENOUS at 04:15

## 2024-08-11 RX ADMIN — LORAZEPAM 0.5 MG: 2 INJECTION INTRAMUSCULAR; INTRAVENOUS at 23:59

## 2024-08-11 RX ADMIN — LORAZEPAM 2 MG: 2 INJECTION INTRAMUSCULAR; INTRAVENOUS at 08:02

## 2024-08-11 SDOH — SOCIAL STABILITY: SOCIAL INSECURITY: DO YOU FEEL UNSAFE GOING BACK TO THE PLACE WHERE YOU ARE LIVING?: NO

## 2024-08-11 SDOH — SOCIAL STABILITY: SOCIAL INSECURITY: ARE THERE ANY APPARENT SIGNS OF INJURIES/BEHAVIORS THAT COULD BE RELATED TO ABUSE/NEGLECT?: NO

## 2024-08-11 SDOH — SOCIAL STABILITY: SOCIAL INSECURITY: DO YOU FEEL ANYONE HAS EXPLOITED OR TAKEN ADVANTAGE OF YOU FINANCIALLY OR OF YOUR PERSONAL PROPERTY?: NO

## 2024-08-11 SDOH — SOCIAL STABILITY: SOCIAL INSECURITY: HAVE YOU HAD THOUGHTS OF HARMING ANYONE ELSE?: NO

## 2024-08-11 SDOH — SOCIAL STABILITY: SOCIAL INSECURITY: ARE YOU OR HAVE YOU BEEN THREATENED OR ABUSED PHYSICALLY, EMOTIONALLY, OR SEXUALLY BY ANYONE?: NO

## 2024-08-11 SDOH — SOCIAL STABILITY: SOCIAL INSECURITY: WERE YOU ABLE TO COMPLETE ALL THE BEHAVIORAL HEALTH SCREENINGS?: YES

## 2024-08-11 SDOH — SOCIAL STABILITY: SOCIAL INSECURITY: POSSIBLE ABUSE REPORTED TO:: OTHER (COMMENT)

## 2024-08-11 SDOH — SOCIAL STABILITY: SOCIAL INSECURITY: DOES ANYONE TRY TO KEEP YOU FROM HAVING/CONTACTING OTHER FRIENDS OR DOING THINGS OUTSIDE YOUR HOME?: NO

## 2024-08-11 SDOH — SOCIAL STABILITY: SOCIAL INSECURITY: ABUSE: ADULT

## 2024-08-11 SDOH — SOCIAL STABILITY: SOCIAL INSECURITY: HAS ANYONE EVER THREATENED TO HURT YOUR FAMILY OR YOUR PETS?: NO

## 2024-08-11 SDOH — SOCIAL STABILITY: SOCIAL INSECURITY: HAVE YOU HAD ANY THOUGHTS OF HARMING ANYONE ELSE?: NO

## 2024-08-11 ASSESSMENT — LIFESTYLE VARIABLES
AUDITORY DISTURBANCES: NOT PRESENT
AGITATION: NORMAL ACTIVITY
HOW OFTEN DURING THE LAST YEAR HAVE YOU FOUND THAT YOU WERE NOT ABLE TO STOP DRINKING ONCE YOU HAD STARTED: LESS THAN MONTHLY
TOTAL SCORE: 0
ANXIETY: NO ANXIETY, AT EASE
AUDIT TOTAL SCORE: 9
HOW OFTEN DURING THE LAST YEAR HAVE YOU FAILED TO DO WHAT WAS NORMALLY EXPECTED FROM YOU BECAUSE OF DRINKING: NEVER
TOTAL SCORE: 10
TREMOR: NO TREMOR
HOW OFTEN DO YOU HAVE 6 OR MORE DRINKS ON ONE OCCASION: WEEKLY
TOTAL SCORE: 9
PRESCIPTION_ABUSE_PAST_12_MONTHS: NO
BLOOD PRESSURE: 140/71
HAS A RELATIVE, FRIEND, DOCTOR, OR ANOTHER HEALTH PROFESSIONAL EXPRESSED CONCERN ABOUT YOUR DRINKING OR SUGGESTED YOU CUT DOWN: NO
HOW OFTEN DURING THE LAST YEAR HAVE YOU HAD A FEELING OF GUILT OR REMORSE AFTER DRINKING: NEVER
HOW OFTEN DO YOU HAVE 6 OR MORE DRINKS ON ONE OCCASION: WEEKLY
AUDITORY DISTURBANCES: NOT PRESENT
HOW OFTEN DO YOU HAVE A DRINK CONTAINING ALCOHOL: 4 OR MORE TIMES A WEEK
VISUAL DISTURBANCES: NOT PRESENT
VISUAL DISTURBANCES: NOT PRESENT
HOW OFTEN DURING THE LAST YEAR HAVE YOU NEEDED AN ALCOHOLIC DRINK FIRST THING IN THE MORNING TO GET YOURSELF GOING AFTER A NIGHT OF HEAVY DRINKING: NEVER
NAUSEA AND VOMITING: NO NAUSEA AND NO VOMITING
ORIENTATION AND CLOUDING OF SENSORIUM: ORIENTED AND CAN DO SERIAL ADDITIONS
HEADACHE, FULLNESS IN HEAD: MODERATE
HOW OFTEN DURING THE LAST YEAR HAVE YOU FAILED TO DO WHAT WAS NORMALLY EXPECTED FROM YOU BECAUSE OF DRINKING: LESS THAN MONTHLY
PAROXYSMAL SWEATS: NO SWEAT VISIBLE
ANXIETY: 3
NAUSEA AND VOMITING: MILD NAUSEA WITH NO VOMITING
TREMOR: 2
NAUSEA AND VOMITING: MILD NAUSEA WITH NO VOMITING
NAUSEA AND VOMITING: NO NAUSEA AND NO VOMITING
PULSE: 120
SUBSTANCE_ABUSE_PAST_12_MONTHS: YES
PAROXYSMAL SWEATS: NO SWEAT VISIBLE
TOTAL SCORE: 0
AUDIT-C TOTAL SCORE: 9
ORIENTATION AND CLOUDING OF SENSORIUM: ORIENTED AND CAN DO SERIAL ADDITIONS
ANXIETY: 3
ANXIETY: 3
TOTAL SCORE: 19
PAROXYSMAL SWEATS: NO SWEAT VISIBLE
PAROXYSMAL SWEATS: NO SWEAT VISIBLE
HOW MANY STANDARD DRINKS CONTAINING ALCOHOL DO YOU HAVE ON A TYPICAL DAY: 7 TO 9
AGITATION: NORMAL ACTIVITY
HAVE YOU OR SOMEONE ELSE BEEN INJURED AS A RESULT OF YOUR DRINKING: NO
AUDIT-C TOTAL SCORE: 8
SKIP TO QUESTIONS 9-10: 0
AGITATION: SOMEWHAT MORE THAN NORMAL ACTIVITY
HOW OFTEN DURING THE LAST YEAR HAVE YOU FAILED TO DO WHAT WAS NORMALLY EXPECTED FROM YOU BECAUSE OF DRINKING: NEVER
TREMOR: NO TREMOR
NAUSEA AND VOMITING: NO NAUSEA AND NO VOMITING
ORIENTATION AND CLOUDING OF SENSORIUM: ORIENTED AND CAN DO SERIAL ADDITIONS
HAS A RELATIVE, FRIEND, DOCTOR, OR ANOTHER HEALTH PROFESSIONAL EXPRESSED CONCERN ABOUT YOUR DRINKING OR SUGGESTED YOU CUT DOWN: NO
VISUAL DISTURBANCES: NOT PRESENT
ANXIETY: 2
TREMOR: MODERATE, WITH PATIENT'S ARMS EXTENDED
TREMOR: 2
HOW OFTEN DURING THE LAST YEAR HAVE YOU BEEN UNABLE TO REMEMBER WHAT HAPPENED THE NIGHT BEFORE BECAUSE YOU HAD BEEN DRINKING: LESS THAN MONTHLY
HEADACHE, FULLNESS IN HEAD: NOT PRESENT
NAUSEA AND VOMITING: NO NAUSEA AND NO VOMITING
HOW OFTEN DO YOU HAVE A DRINK CONTAINING ALCOHOL: 2-3 TIMES A WEEK
AUDITORY DISTURBANCES: NOT PRESENT
HOW OFTEN DURING THE LAST YEAR HAVE YOU BEEN UNABLE TO REMEMBER WHAT HAPPENED THE NIGHT BEFORE BECAUSE YOU HAD BEEN DRINKING: NEVER
AUDITORY DISTURBANCES: NOT PRESENT
HAVE YOU OR SOMEONE ELSE BEEN INJURED AS A RESULT OF YOUR DRINKING: NO
AUDIT TOTAL SCORE: 0
TOTAL SCORE: 5
HOW OFTEN DO YOU HAVE A DRINK CONTAINING ALCOHOL: 4 OR MORE TIMES A WEEK
AGITATION: NORMAL ACTIVITY
AUDIT-C TOTAL SCORE: 9
AUDITORY DISTURBANCES: NOT PRESENT
AUDIT-C TOTAL SCORE: 8
ORIENTATION AND CLOUDING OF SENSORIUM: ORIENTED AND CAN DO SERIAL ADDITIONS
BLOOD PRESSURE: 131/73
ORIENTATION AND CLOUDING OF SENSORIUM: ORIENTED AND CAN DO SERIAL ADDITIONS
HAVE YOU OR SOMEONE ELSE BEEN INJURED AS A RESULT OF YOUR DRINKING: NO
HOW OFTEN DURING THE LAST YEAR HAVE YOU HAD A FEELING OF GUILT OR REMORSE AFTER DRINKING: NEVER
PULSE: 110
TREMOR: 3
HOW OFTEN DO YOU HAVE 6 OR MORE DRINKS ON ONE OCCASION: MONTHLY
ANXIETY: NO ANXIETY, AT EASE
PAROXYSMAL SWEATS: 2
VISUAL DISTURBANCES: NOT PRESENT
HOW MANY STANDARD DRINKS CONTAINING ALCOHOL DO YOU HAVE ON A TYPICAL DAY: 5 OR 6
TREMOR: MODERATE, WITH PATIENT'S ARMS EXTENDED
AUDITORY DISTURBANCES: NOT PRESENT
TOTAL SCORE: 16
AGITATION: NORMAL ACTIVITY
NAUSEA AND VOMITING: MILD NAUSEA WITH NO VOMITING
HOW OFTEN DURING THE LAST YEAR HAVE YOU FOUND THAT YOU WERE NOT ABLE TO STOP DRINKING ONCE YOU HAD STARTED: NEVER
SKIP TO QUESTIONS 9-10: 0
VISUAL DISTURBANCES: NOT PRESENT
PAROXYSMAL SWEATS: BARELY PERCEPTIBLE SWEATING, PALMS MOIST
HEADACHE, FULLNESS IN HEAD: NOT PRESENT
ANXIETY: 3
HOW OFTEN DURING THE LAST YEAR HAVE YOU NEEDED AN ALCOHOLIC DRINK FIRST THING IN THE MORNING TO GET YOURSELF GOING AFTER A NIGHT OF HEAVY DRINKING: NEVER
AGITATION: NORMAL ACTIVITY
SKIP TO QUESTIONS 9-10: 0
AGITATION: NORMAL ACTIVITY
ORIENTATION AND CLOUDING OF SENSORIUM: ORIENTED AND CAN DO SERIAL ADDITIONS
AUDIT TOTAL SCORE: 11
VISUAL DISTURBANCES: NOT PRESENT
SUBSTANCE_ABUSE_PAST_12_MONTHS: YES
AUDIT TOTAL SCORE: 3
AGITATION: NORMAL ACTIVITY
AUDITORY DISTURBANCES: NOT PRESENT
AUDIT TOTAL SCORE: 9
AUDIT-C TOTAL SCORE: 9
ANXIETY: NO ANXIETY, AT EASE
AUDITORY DISTURBANCES: NOT PRESENT
HOW OFTEN DURING THE LAST YEAR HAVE YOU HAD A FEELING OF GUILT OR REMORSE AFTER DRINKING: NEVER
HEADACHE, FULLNESS IN HEAD: NOT PRESENT
VISUAL DISTURBANCES: NOT PRESENT
HEADACHE, FULLNESS IN HEAD: SEVERE
HEADACHE, FULLNESS IN HEAD: NOT PRESENT
PRESCIPTION_ABUSE_PAST_12_MONTHS: NO
AUDIT-C TOTAL SCORE: 9
HAS A RELATIVE, FRIEND, DOCTOR, OR ANOTHER HEALTH PROFESSIONAL EXPRESSED CONCERN ABOUT YOUR DRINKING OR SUGGESTED YOU CUT DOWN: NO
HOW OFTEN DURING THE LAST YEAR HAVE YOU FOUND THAT YOU WERE NOT ABLE TO STOP DRINKING ONCE YOU HAD STARTED: NEVER
ORIENTATION AND CLOUDING OF SENSORIUM: ORIENTED AND CAN DO SERIAL ADDITIONS
NAUSEA AND VOMITING: MILD NAUSEA WITH NO VOMITING
TOTAL SCORE: 0
AUDIT TOTAL SCORE: 0
HOW MANY STANDARD DRINKS CONTAINING ALCOHOL DO YOU HAVE ON A TYPICAL DAY: 5 OR 6
HEADACHE, FULLNESS IN HEAD: EXTREMELY SEVERE
HEADACHE, FULLNESS IN HEAD: MODERATE
ORIENTATION AND CLOUDING OF SENSORIUM: ORIENTED AND CAN DO SERIAL ADDITIONS
HOW OFTEN DURING THE LAST YEAR HAVE YOU BEEN UNABLE TO REMEMBER WHAT HAPPENED THE NIGHT BEFORE BECAUSE YOU HAD BEEN DRINKING: NEVER
VISUAL DISTURBANCES: NOT PRESENT
PAROXYSMAL SWEATS: NO SWEAT VISIBLE
HOW OFTEN DURING THE LAST YEAR HAVE YOU NEEDED AN ALCOHOLIC DRINK FIRST THING IN THE MORNING TO GET YOURSELF GOING AFTER A NIGHT OF HEAVY DRINKING: NEVER
TREMOR: NO TREMOR
PAROXYSMAL SWEATS: BEADS OF SWEAT OBVIOUS ON FOREHEAD

## 2024-08-11 ASSESSMENT — COGNITIVE AND FUNCTIONAL STATUS - GENERAL
DRESSING REGULAR LOWER BODY CLOTHING: A LITTLE
DRESSING REGULAR UPPER BODY CLOTHING: A LITTLE
STANDING UP FROM CHAIR USING ARMS: A LOT
WALKING IN HOSPITAL ROOM: TOTAL
TOILETING: A LITTLE
CLIMB 3 TO 5 STEPS WITH RAILING: TOTAL
DAILY ACTIVITIY SCORE: 20
HELP NEEDED FOR BATHING: A LITTLE
STANDING UP FROM CHAIR USING ARMS: A LOT
HELP NEEDED FOR BATHING: A LITTLE
CLIMB 3 TO 5 STEPS WITH RAILING: TOTAL
CLIMB 3 TO 5 STEPS WITH RAILING: TOTAL
DRESSING REGULAR LOWER BODY CLOTHING: A LITTLE
DRESSING REGULAR UPPER BODY CLOTHING: A LITTLE
DRESSING REGULAR LOWER BODY CLOTHING: A LITTLE
HELP NEEDED FOR BATHING: A LITTLE
WALKING IN HOSPITAL ROOM: TOTAL
STANDING UP FROM CHAIR USING ARMS: A LOT
MOBILITY SCORE: 16
DRESSING REGULAR UPPER BODY CLOTHING: A LITTLE
MOBILITY SCORE: 16
PATIENT BASELINE BEDBOUND: NO
TOILETING: A LITTLE
TOILETING: A LITTLE
WALKING IN HOSPITAL ROOM: TOTAL
DAILY ACTIVITIY SCORE: 20
MOBILITY SCORE: 16
DAILY ACTIVITIY SCORE: 20

## 2024-08-11 ASSESSMENT — PAIN SCALES - GENERAL
PAINLEVEL_OUTOF10: 7
PAINLEVEL_OUTOF10: 6
PAINLEVEL_OUTOF10: 8
PAINLEVEL_OUTOF10: 8
PAINLEVEL_OUTOF10: 4

## 2024-08-11 ASSESSMENT — ACTIVITIES OF DAILY LIVING (ADL)
DRESSING YOURSELF: INDEPENDENT
ADEQUATE_TO_COMPLETE_ADL: YES
HEARING - RIGHT EAR: HEARING AID
FEEDING YOURSELF: INDEPENDENT
HEARING - LEFT EAR: HEARING AID
GROOMING: INDEPENDENT
WALKS IN HOME: NEEDS ASSISTANCE
BATHING: INDEPENDENT
JUDGMENT_ADEQUATE_SAFELY_COMPLETE_DAILY_ACTIVITIES: UNABLE TO ASSESS
LACK_OF_TRANSPORTATION: NO
PATIENT'S MEMORY ADEQUATE TO SAFELY COMPLETE DAILY ACTIVITIES?: UNABLE TO ASSESS
LACK_OF_TRANSPORTATION: NO
TOILETING: INDEPENDENT

## 2024-08-11 ASSESSMENT — PAIN - FUNCTIONAL ASSESSMENT
PAIN_FUNCTIONAL_ASSESSMENT: 0-10

## 2024-08-11 ASSESSMENT — PATIENT HEALTH QUESTIONNAIRE - PHQ9
2. FEELING DOWN, DEPRESSED OR HOPELESS: NOT AT ALL
1. LITTLE INTEREST OR PLEASURE IN DOING THINGS: NOT AT ALL
1. LITTLE INTEREST OR PLEASURE IN DOING THINGS: NOT AT ALL
2. FEELING DOWN, DEPRESSED OR HOPELESS: NOT AT ALL
SUM OF ALL RESPONSES TO PHQ9 QUESTIONS 1 & 2: 0
SUM OF ALL RESPONSES TO PHQ9 QUESTIONS 1 & 2: 0
1. LITTLE INTEREST OR PLEASURE IN DOING THINGS: NOT AT ALL
SUM OF ALL RESPONSES TO PHQ9 QUESTIONS 1 & 2: 0
2. FEELING DOWN, DEPRESSED OR HOPELESS: NOT AT ALL

## 2024-08-11 ASSESSMENT — PAIN DESCRIPTION - DESCRIPTORS
DESCRIPTORS: PINS AND NEEDLES
DESCRIPTORS: PINS AND NEEDLES

## 2024-08-11 NOTE — H&P
History Of Present Illness  Lalo Pack is a 73 y.o. male presenting with unresponsive episode.  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  Patient was brought to the EMS when wife called as patient was unresponsive.  Apparently patient is drinking heavily for the past few days.  In the ER patient was found to have alcohol intoxication initially somnolent and minimally responsive but later patient was alert oriented.  Patient had right AKA from septic prosthetic joint and to control the phantom pain he takes narcotics and alcohol .  He has quit smoking long time ago.  Patient mobilizes using a wheelchair and drinks regularly  Patient was found to have UTI in ER     Past Medical History  Past Medical History:   Diagnosis Date    Fracture of unspecified metatarsal bone(s), unspecified foot, initial encounter for closed fracture 01/18/2020    Fracture of metatarsal bone, closed    Other specified disorders of bone, lower leg 10/10/2019    Tibial pain    Pain in right foot 10/14/2019    Right foot pain    Pain, unspecified 04/23/2021    Pain    Sleep apnea     Unspecified fracture of shaft of right tibia, initial encounter for closed fracture 09/23/2021    Fracture of right tibia       Surgical History  Past Surgical History:   Procedure Laterality Date    AMPUTATION      JOINT REPLACEMENT      KNEE ARTHROPLASTY      TUMOR EXCISION Left     left hand    WRIST FUSION Right         Social History  He reports that he has quit smoking. His smoking use included cigarettes. He has never used smokeless tobacco. He reports current alcohol use. He reports current drug use. Drug: Marijuana.    Family History  No family history on file.     Allergies  Cephalexin, Simvastatin, Triamterene-hydrochlorothiazid, and Ibuprofen    Review of Systems   Constitutional:  Negative for activity change, appetite change, chills, diaphoresis, fatigue, fever and unexpected weight change.    HENT:  Negative for congestion, dental problem, drooling, ear discharge, ear pain, facial swelling, hearing loss, mouth sores, nosebleeds, postnasal drip, rhinorrhea, sinus pressure, sinus pain, sneezing, sore throat, tinnitus, trouble swallowing and voice change.    Eyes:  Negative for photophobia, pain, discharge, redness, itching and visual disturbance.   Respiratory:  Negative for apnea, cough, choking, chest tightness, shortness of breath, wheezing and stridor.    Cardiovascular:  Negative for chest pain, palpitations and leg swelling.   Gastrointestinal:  Negative for abdominal distention, abdominal pain, anal bleeding, blood in stool, constipation, diarrhea, nausea, rectal pain and vomiting.   Endocrine: Negative for cold intolerance, heat intolerance, polydipsia, polyphagia and polyuria.   Genitourinary:  Negative for decreased urine volume, difficulty urinating, dysuria, enuresis, flank pain, frequency, genital sores, hematuria and urgency.   Musculoskeletal:  Negative for arthralgias, back pain, gait problem, joint swelling, myalgias, neck pain and neck stiffness.   Skin:  Negative for color change, pallor, rash and wound.   Allergic/Immunologic: Negative for environmental allergies, food allergies and immunocompromised state.   Neurological:  Negative for dizziness, tremors, seizures, syncope, facial asymmetry, speech difficulty, weakness, light-headedness, numbness and headaches.   Hematological:  Negative for adenopathy. Does not bruise/bleed easily.   Psychiatric/Behavioral:  Negative for agitation, behavioral problems, confusion, decreased concentration, dysphoric mood, hallucinations, self-injury, sleep disturbance and suicidal ideas. The patient is not nervous/anxious and is not hyperactive.         Physical Exam  Vitals reviewed.   Constitutional:       Appearance: Normal appearance.   HENT:      Head: Normocephalic and atraumatic.      Right Ear: Tympanic membrane, ear canal and external ear  "normal.      Left Ear: Tympanic membrane, ear canal and external ear normal.      Nose: Nose normal.      Mouth/Throat:      Pharynx: Oropharynx is clear.   Eyes:      Extraocular Movements: Extraocular movements intact.      Conjunctiva/sclera: Conjunctivae normal.      Pupils: Pupils are equal, round, and reactive to light.   Cardiovascular:      Rate and Rhythm: Normal rate and regular rhythm.      Pulses: Normal pulses.      Heart sounds: Normal heart sounds.   Pulmonary:      Effort: Pulmonary effort is normal.      Breath sounds: Normal breath sounds.   Abdominal:      General: Abdomen is flat. Bowel sounds are normal.      Palpations: Abdomen is soft.   Musculoskeletal:      Cervical back: Normal range of motion and neck supple.      Comments: Right AKA   Skin:     General: Skin is warm and dry.   Neurological:      General: No focal deficit present.      Mental Status: He is alert and oriented to person, place, and time.   Psychiatric:         Mood and Affect: Mood normal.          Last Recorded Vitals  Blood pressure (!) 164/122, pulse (!) 115, temperature 36.6 °C (97.9 °F), temperature source Temporal, resp. rate 20, height 1.778 m (5' 10\"), weight 93 kg (205 lb), SpO2 98%.    Relevant Results        Scheduled medications  [START ON 8/11/2024] cefTRIAXone, 1 g, intravenous, q24h  [START ON 8/11/2024] enoxaparin, 40 mg, subcutaneous, q24h CONSUELO  folic acid, 1 mg, oral, Daily  gabapentin, 300 mg, oral, TID  multivitamin with minerals, 1 tablet, oral, Daily  [START ON 8/13/2024] thiamine, 100 mg, oral, Daily  thiamine, 100 mg, intravenous, Daily      Continuous medications     PRN medications  PRN medications: busPIRone, LORazepam **OR** LORazepam **OR** LORazepam, LORazepam **OR** LORazepam **OR** LORazepam, oxyCODONE  Results for orders placed or performed during the hospital encounter of 08/10/24 (from the past 24 hour(s))   Lipase   Result Value Ref Range    Lipase 47 16 - 63 U/L   Serial Troponin, Initial " ([facility])   Result Value Ref Range    Troponin T, High Sensitivity 25 (HH) <=14 ng/L   Magnesium   Result Value Ref Range    Magnesium 2.10 1.60 - 3.10 mg/dL   Phosphorus   Result Value Ref Range    Phosphorus 3.4 2.5 - 4.5 mg/dL   CBC and Auto Differential   Result Value Ref Range    WBC 13.0 (H) 4.4 - 11.3 x10*3/uL    nRBC 0.0 0.0 - 0.0 /100 WBCs    RBC 5.51 4.50 - 5.90 x10*6/uL    Hemoglobin 15.4 13.5 - 17.5 g/dL    Hematocrit 47.8 41.0 - 52.0 %    MCV 87 80 - 100 fL    MCH 27.9 26.0 - 34.0 pg    MCHC 32.2 32.0 - 36.0 g/dL    RDW 15.3 (H) 11.5 - 14.5 %    Platelets 316 150 - 450 x10*3/uL    Neutrophils % 75.7 40.0 - 80.0 %    Immature Granulocytes %, Automated 0.6 0.0 - 0.9 %    Lymphocytes % 16.7 13.0 - 44.0 %    Monocytes % 5.2 2.0 - 10.0 %    Eosinophils % 1.0 0.0 - 6.0 %    Basophils % 0.8 0.0 - 2.0 %    Neutrophils Absolute 9.87 (H) 1.60 - 5.50 x10*3/uL    Immature Granulocytes Absolute, Automated 0.08 0.00 - 0.50 x10*3/uL    Lymphocytes Absolute 2.18 0.80 - 3.00 x10*3/uL    Monocytes Absolute 0.68 0.05 - 0.80 x10*3/uL    Eosinophils Absolute 0.13 0.00 - 0.40 x10*3/uL    Basophils Absolute 0.10 0.00 - 0.10 x10*3/uL   Comprehensive metabolic panel   Result Value Ref Range    Glucose 84 65 - 99 mg/dL    Sodium 143 133 - 145 mmol/L    Potassium 3.7 3.4 - 5.1 mmol/L    Chloride 104 97 - 107 mmol/L    Bicarbonate 13 (L) 24 - 31 mmol/L    Urea Nitrogen 14 8 - 25 mg/dL    Creatinine 1.00 0.40 - 1.60 mg/dL    eGFR 79 >60 mL/min/1.73m*2    Calcium 9.3 8.5 - 10.4 mg/dL    Albumin 4.6 3.5 - 5.0 g/dL    Alkaline Phosphatase 118 35 - 125 U/L    Total Protein 8.2 (H) 5.9 - 7.9 g/dL    AST 22 5 - 40 U/L    Bilirubin, Total 0.6 0.1 - 1.2 mg/dL    ALT 18 5 - 40 U/L    Anion Gap >19 (H) <=19 mmol/L   Blood Gas Lactic Acid, Venous   Result Value Ref Range    POCT Lactate, Venous 8.8 (HH) 0.4 - 2.0 mmol/L   Ammonia   Result Value Ref Range    Ammonia 23 12 - 45 umol/L   Acute Toxicology Panel, Blood   Result Value Ref Range     Acetaminophen <5.0 15.0 - 30.0 ug/mL    Salicylate  <0 3 - 25 mg/dL    Alcohol 0.368 (HH) 0.000 - 0.010 g/dL   Blood Culture    Specimen: Peripheral Venipuncture; Blood culture   Result Value Ref Range    Blood Culture Loaded on Instrument - Culture in progress    Blood Culture    Specimen: Peripheral Venipuncture; Blood culture   Result Value Ref Range    Blood Culture Loaded on Instrument - Culture in progress    POCT GLUCOSE   Result Value Ref Range    POCT Glucose 78 74 - 99 mg/dL   Drug Screen, Urine   Result Value Ref Range    Amphetamine Screen, Urine Negative      Barbiturate Screen, Urine Negative      Benzodiazepines Screen, Urine Negative      Cannabinoid Screen, Urine Negative      Cocaine Metabolite Screen, Urine Negative      Fentanyl Screen, Urine Negative       Methadone Screen, Urine Negative      Opiate Screen, Urine Negative      Oxycodone Screen, Urine Negative      PCP Screen, Urine Negative     Urinalysis with Reflex Culture and Microscopic   Result Value Ref Range    Color, Urine Light-Yellow Light-Yellow, Yellow, Dark-Yellow    Appearance, Urine Turbid (N) Clear    Specific Gravity, Urine 1.015 1.005 - 1.035    pH, Urine 6.0 5.0, 5.5, 6.0, 6.5, 7.0, 7.5, 8.0    Protein, Urine 50 (1+) (A) NEGATIVE, 10 (TRACE), 20 (TRACE) mg/dL    Glucose, Urine Normal Normal mg/dL    Blood, Urine 0.5 (2+) (A) NEGATIVE    Ketones, Urine TRACE (A) NEGATIVE mg/dL    Bilirubin, Urine NEGATIVE NEGATIVE    Urobilinogen, Urine Normal Normal mg/dL    Nitrite, Urine 1+ (A) NEGATIVE    Leukocyte Esterase, Urine 500 Aracelis/µL (A) NEGATIVE   Microscopic Only, Urine   Result Value Ref Range    WBC, Urine >50 (A) 1-5, NONE /HPF    WBC Clumps, Urine FEW Reference range not established. /HPF    RBC, Urine >20 (A) NONE, 1-2, 3-5 /HPF    Bacteria, Urine 1+ (A) NONE SEEN /HPF    Mucus, Urine FEW Reference range not established. /LPF   Serial Troponin, 2 Hour ([facility])   Result Value Ref Range    Troponin T, High Sensitivity 23 (HH)  <=14 ng/L   Blood Gas Lactic Acid, Venous   Result Value Ref Range    POCT Lactate, Venous 6.9 (HH) 0.4 - 2.0 mmol/L   Serial Troponin, 6 Hour ([facility])   Result Value Ref Range    Troponin T, High Sensitivity 26 (HH) <=14 ng/L     CT head wo IV contrast    Result Date: 8/10/2024  Interpreted By:  Sylvain Christina, STUDY: CT HEAD WO IV CONTRAST; ;  8/10/2024 3:23 pm   INDICATION: Signs/Symptoms:ams.   COMPARISON: 03/28/2024   ACCESSION NUMBER(S): [id number]   ORDERING CLINICIAN: LAUREN PAINTER   TECHNIQUE: Serial axial unenhanced CT images obtained of the head. Images reformatted in the coronal and sagittal projection.   FINDINGS: Mild parenchymal volume loss with prominence of the ventricles, sulci, and cisterns. Gray-white matter differentiation is maintained. Mild periventricular white matter change seen nonspecific and likely related to chronic small-vessel ischemic change.   Visualized osseous structures demonstrate unremarkable paranasal sinuses and mastoid air cells.               1. No acute intracranial abnormality   2. Mild parenchymal volume loss     MACRO: None   Signed by: Sylvain Christina 8/10/2024 3:35 PM Dictation workstation:   [id number]    XR chest 1 view    Result Date: 8/10/2024  Interpreted By:  Sylvain Christina, STUDY: XR CHEST 1 VIEW 8/10/2024 3:13 pm   INDICATION: Signs/Symptoms:chest pain   COMPARISON: 08/21/2023   ACCESSION NUMBER(S): [id number]   ORDERING CLINICIAN: KATELYN RODRIGUEZ   TECHNIQUE: Single AP view chest   FINDINGS: Cardiomediastinal silhouette is within normal limits. Aorta is mildly tortuous. Lung fields are hypo inflated with basilar bronchovascular crowding. There is no airspace consolidation. No pleural effusions demonstrated. Visualized osseous structures are unremarkable.             1. Lung fields hypoinflated without acute process.   Signed by: Sylvain Christina 8/10/2024 3:33 PM Dictation workstation:   [id number]          Assessment/Plan   Principal Problem:    Acute  cystitis with hematuria  Active Problems:    Phantom pain after amputation of lower extremity (Multi)    Above-knee amputation (Multi)    Alcohol intoxication (CMS-HCC)    Hx of CABG      Start IV antibiotics for UTI  CIWA protocol  Continue home medication  DVT prophylaxis  Pain medication  See orders for details       I spent  minutes in the professional and overall care of this patient.      Verónica Winston MD

## 2024-08-11 NOTE — ED NOTES
CIWA remains elevated but pt appears to be more at ease and comfortable. Pt updated regarding plan of care and called wife to update her.     Cristy Mcconnell RN  08/10/24 [id number]

## 2024-08-11 NOTE — CARE PLAN
Problem: Pain - Adult  Goal: Verbalizes/displays adequate comfort level or baseline comfort level  Outcome: Progressing     Problem: Safety - Adult  Goal: Free from fall injury  Outcome: Progressing     Problem: Discharge Planning  Goal: Discharge to home or other facility with appropriate resources  Outcome: Progressing     Problem: Chronic Conditions and Co-morbidities  Goal: Patient's chronic conditions and co-morbidity symptoms are monitored and maintained or improved  Outcome: Progressing     Problem: Pain  Goal: Takes deep breaths with improved pain control throughout the shift  Outcome: Progressing  Goal: Turns in bed with improved pain control throughout the shift  Outcome: Progressing  Goal: Walks with improved pain control throughout the shift  Outcome: Progressing  Goal: Performs ADL's with improved pain control throughout shift  Outcome: Progressing  Goal: Participates in PT with improved pain control throughout the shift  Outcome: Progressing  Goal: Free from opioid side effects throughout the shift  Outcome: Progressing  Goal: Free from acute confusion related to pain meds throughout the shift  Outcome: Progressing     Problem: Skin  Goal: Decreased wound size/increased tissue granulation at next dressing change  Outcome: Progressing  Flowsheets (Taken 8/11/2024 0950)  Decreased wound size/increased tissue granulation at next dressing change: Promote sleep for wound healing  Goal: Participates in plan/prevention/treatment measures  Outcome: Progressing  Flowsheets (Taken 8/11/2024 0950)  Participates in plan/prevention/treatment measures:   Discuss with provider PT/OT consult   Elevate heels  Goal: Prevent/manage excess moisture  Outcome: Progressing  Flowsheets (Taken 8/11/2024 0950)  Prevent/manage excess moisture: Cleanse incontinence/protect with barrier cream  Goal: Prevent/minimize sheer/friction injuries  Outcome: Progressing  Flowsheets (Taken 8/11/2024 0950)  Prevent/minimize sheer/friction  injuries:   Use pull sheet   HOB 30 degrees or less  Goal: Promote/optimize nutrition  Outcome: Progressing  Flowsheets (Taken 8/11/2024 0950)  Promote/optimize nutrition:   Consume > 50% meals/supplements   Monitor/record intake including meals   Offer water/supplements/favorite foods  Goal: Promote skin healing  Outcome: Progressing  Flowsheets (Taken 8/11/2024 0950)  Promote skin healing:   Protective dressings over bony prominences   Assess skin/pad under line(s)/device(s)   The patient's goals for the shift include Treat pain , start antibiotic    The clinical goals for the shift include safety, establish POC

## 2024-08-12 PROBLEM — E87.6 HYPOKALEMIA: Status: ACTIVE | Noted: 2024-08-12

## 2024-08-12 LAB
ANION GAP SERPL CALC-SCNC: 15 MMOL/L
ATRIAL RATE: 116 BPM
BACTERIA UR CULT: ABNORMAL
BUN SERPL-MCNC: 11 MG/DL (ref 8–25)
CALCIUM SERPL-MCNC: 8.7 MG/DL (ref 8.5–10.4)
CHLORIDE SERPL-SCNC: 105 MMOL/L (ref 97–107)
CO2 SERPL-SCNC: 20 MMOL/L (ref 24–31)
CREAT SERPL-MCNC: 1 MG/DL (ref 0.4–1.6)
EGFRCR SERPLBLD CKD-EPI 2021: 79 ML/MIN/1.73M*2
ERYTHROCYTE [DISTWIDTH] IN BLOOD BY AUTOMATED COUNT: 15 % (ref 11.5–14.5)
GLUCOSE SERPL-MCNC: 115 MG/DL (ref 65–99)
HCT VFR BLD AUTO: 39.9 % (ref 41–52)
HGB BLD-MCNC: 13.1 G/DL (ref 13.5–17.5)
MCH RBC QN AUTO: 27.9 PG (ref 26–34)
MCHC RBC AUTO-ENTMCNC: 32.8 G/DL (ref 32–36)
MCV RBC AUTO: 85 FL (ref 80–100)
NRBC BLD-RTO: 0 /100 WBCS (ref 0–0)
PLATELET # BLD AUTO: 182 X10*3/UL (ref 150–450)
POTASSIUM SERPL-SCNC: 2.9 MMOL/L (ref 3.4–5.1)
PR INTERVAL: 144 MS
Q ONSET: 220 MS
QRS COUNT: 19 BEATS
QRS DURATION: 94 MS
QT INTERVAL: 338 MS
QTC CALCULATION(BAZETT): 469 MS
QTC FREDERICIA: 421 MS
R AXIS: 14 DEGREES
RBC # BLD AUTO: 4.7 X10*6/UL (ref 4.5–5.9)
SODIUM SERPL-SCNC: 140 MMOL/L (ref 133–145)
T AXIS: 38 DEGREES
T OFFSET: 389 MS
VENTRICULAR RATE: 116 BPM
WBC # BLD AUTO: 12 X10*3/UL (ref 4.4–11.3)

## 2024-08-12 PROCEDURE — 99223 1ST HOSP IP/OBS HIGH 75: CPT | Performed by: INTERNAL MEDICINE

## 2024-08-12 PROCEDURE — 85027 COMPLETE CBC AUTOMATED: CPT | Performed by: INTERNAL MEDICINE

## 2024-08-12 PROCEDURE — 2500000004 HC RX 250 GENERAL PHARMACY W/ HCPCS (ALT 636 FOR OP/ED): Performed by: INTERNAL MEDICINE

## 2024-08-12 PROCEDURE — 80048 BASIC METABOLIC PNL TOTAL CA: CPT | Performed by: INTERNAL MEDICINE

## 2024-08-12 PROCEDURE — 2500000004 HC RX 250 GENERAL PHARMACY W/ HCPCS (ALT 636 FOR OP/ED)

## 2024-08-12 PROCEDURE — 2500000002 HC RX 250 W HCPCS SELF ADMINISTERED DRUGS (ALT 637 FOR MEDICARE OP, ALT 636 FOR OP/ED): Performed by: INTERNAL MEDICINE

## 2024-08-12 PROCEDURE — 99233 SBSQ HOSP IP/OBS HIGH 50: CPT | Performed by: INTERNAL MEDICINE

## 2024-08-12 PROCEDURE — 2060000001 HC INTERMEDIATE ICU ROOM DAILY

## 2024-08-12 PROCEDURE — 2500000001 HC RX 250 WO HCPCS SELF ADMINISTERED DRUGS (ALT 637 FOR MEDICARE OP)

## 2024-08-12 PROCEDURE — 36415 COLL VENOUS BLD VENIPUNCTURE: CPT | Performed by: INTERNAL MEDICINE

## 2024-08-12 PROCEDURE — 2500000001 HC RX 250 WO HCPCS SELF ADMINISTERED DRUGS (ALT 637 FOR MEDICARE OP): Performed by: INTERNAL MEDICINE

## 2024-08-12 RX ORDER — METOPROLOL SUCCINATE 50 MG/1
50 TABLET, EXTENDED RELEASE ORAL EVERY 12 HOURS
Status: DISCONTINUED | OUTPATIENT
Start: 2024-08-12 | End: 2024-08-13 | Stop reason: HOSPADM

## 2024-08-12 RX ORDER — POTASSIUM CHLORIDE 14.9 MG/ML
20 INJECTION INTRAVENOUS
Status: COMPLETED | OUTPATIENT
Start: 2024-08-12 | End: 2024-08-12

## 2024-08-12 RX ORDER — POTASSIUM CHLORIDE 20 MEQ/1
40 TABLET, EXTENDED RELEASE ORAL ONCE
Status: COMPLETED | OUTPATIENT
Start: 2024-08-12 | End: 2024-08-12

## 2024-08-12 RX ORDER — ASPIRIN 81 MG/1
81 TABLET ORAL DAILY
Status: DISCONTINUED | OUTPATIENT
Start: 2024-08-12 | End: 2024-08-13 | Stop reason: HOSPADM

## 2024-08-12 RX ORDER — ROSUVASTATIN CALCIUM 10 MG/1
10 TABLET, COATED ORAL NIGHTLY
Status: DISCONTINUED | OUTPATIENT
Start: 2024-08-12 | End: 2024-08-13 | Stop reason: HOSPADM

## 2024-08-12 RX ADMIN — OXYCODONE 5 MG: 5 TABLET ORAL at 20:03

## 2024-08-12 RX ADMIN — OXYCODONE 5 MG: 5 TABLET ORAL at 11:38

## 2024-08-12 RX ADMIN — CEFTRIAXONE SODIUM 1 G: 1 INJECTION, SOLUTION INTRAVENOUS at 01:05

## 2024-08-12 RX ADMIN — Medication 1 TABLET: at 08:31

## 2024-08-12 RX ADMIN — GABAPENTIN 300 MG: 300 CAPSULE ORAL at 08:31

## 2024-08-12 RX ADMIN — GABAPENTIN 300 MG: 300 CAPSULE ORAL at 20:03

## 2024-08-12 RX ADMIN — POTASSIUM CHLORIDE 20 MEQ: 14.9 INJECTION, SOLUTION INTRAVENOUS at 14:23

## 2024-08-12 RX ADMIN — LORAZEPAM 2 MG: 2 INJECTION INTRAMUSCULAR; INTRAVENOUS at 02:11

## 2024-08-12 RX ADMIN — FOLIC ACID 1 MG: 1 TABLET ORAL at 08:32

## 2024-08-12 RX ADMIN — LORAZEPAM 0.5 MG: 0.5 TABLET ORAL at 15:31

## 2024-08-12 RX ADMIN — ASPIRIN 81 MG: 81 TABLET, COATED ORAL at 16:44

## 2024-08-12 RX ADMIN — LORAZEPAM 0.5 MG: 0.5 TABLET ORAL at 11:37

## 2024-08-12 RX ADMIN — CEFTRIAXONE SODIUM 1 G: 1 INJECTION, SOLUTION INTRAVENOUS at 23:57

## 2024-08-12 RX ADMIN — LORAZEPAM 2 MG: 1 TABLET ORAL at 20:03

## 2024-08-12 RX ADMIN — ENOXAPARIN SODIUM 40 MG: 40 INJECTION SUBCUTANEOUS at 08:31

## 2024-08-12 RX ADMIN — LORAZEPAM 2 MG: 2 INJECTION INTRAMUSCULAR; INTRAVENOUS at 04:15

## 2024-08-12 RX ADMIN — BUSPIRONE HYDROCHLORIDE 5 MG: 5 TABLET ORAL at 20:03

## 2024-08-12 RX ADMIN — ROSUVASTATIN CALCIUM 10 MG: 10 TABLET, COATED ORAL at 20:03

## 2024-08-12 RX ADMIN — POTASSIUM CHLORIDE 40 MEQ: 1500 TABLET, EXTENDED RELEASE ORAL at 14:18

## 2024-08-12 RX ADMIN — METOPROLOL SUCCINATE 25 MG: 25 TABLET, EXTENDED RELEASE ORAL at 08:31

## 2024-08-12 RX ADMIN — THIAMINE HYDROCHLORIDE 100 MG: 100 INJECTION, SOLUTION INTRAMUSCULAR; INTRAVENOUS at 08:32

## 2024-08-12 RX ADMIN — METOPROLOL SUCCINATE 50 MG: 50 TABLET, EXTENDED RELEASE ORAL at 20:03

## 2024-08-12 RX ADMIN — POTASSIUM CHLORIDE 20 MEQ: 14.9 INJECTION, SOLUTION INTRAVENOUS at 16:07

## 2024-08-12 RX ADMIN — GABAPENTIN 300 MG: 300 CAPSULE ORAL at 14:18

## 2024-08-12 SDOH — HEALTH STABILITY: MENTAL HEALTH: HOW OFTEN DO YOU HAVE A DRINK CONTAINING ALCOHOL?: 4 OR MORE TIMES A WEEK

## 2024-08-12 SDOH — ECONOMIC STABILITY: INCOME INSECURITY: IN THE PAST 12 MONTHS, HAS THE ELECTRIC, GAS, OIL, OR WATER COMPANY THREATENED TO SHUT OFF SERVICE IN YOUR HOME?: NO

## 2024-08-12 SDOH — ECONOMIC STABILITY: FOOD INSECURITY: WITHIN THE PAST 12 MONTHS, THE FOOD YOU BOUGHT JUST DIDN'T LAST AND YOU DIDN'T HAVE MONEY TO GET MORE.: NEVER TRUE

## 2024-08-12 SDOH — SOCIAL STABILITY: SOCIAL INSECURITY: WITHIN THE LAST YEAR, HAVE YOU BEEN HUMILIATED OR EMOTIONALLY ABUSED IN OTHER WAYS BY YOUR PARTNER OR EX-PARTNER?: NO

## 2024-08-12 SDOH — SOCIAL STABILITY: SOCIAL NETWORK: HOW OFTEN DO YOU ATTENT MEETINGS OF THE CLUB OR ORGANIZATION YOU BELONG TO?: NEVER

## 2024-08-12 SDOH — ECONOMIC STABILITY: HOUSING INSECURITY: AT ANY TIME IN THE PAST 12 MONTHS, WERE YOU HOMELESS OR LIVING IN A SHELTER (INCLUDING NOW)?: NO

## 2024-08-12 SDOH — HEALTH STABILITY: MENTAL HEALTH
STRESS IS WHEN SOMEONE FEELS TENSE, NERVOUS, ANXIOUS, OR CAN'T SLEEP AT NIGHT BECAUSE THEIR MIND IS TROUBLED. HOW STRESSED ARE YOU?: TO SOME EXTENT

## 2024-08-12 SDOH — ECONOMIC STABILITY: INCOME INSECURITY: IN THE LAST 12 MONTHS, WAS THERE A TIME WHEN YOU WERE NOT ABLE TO PAY THE MORTGAGE OR RENT ON TIME?: NO

## 2024-08-12 SDOH — SOCIAL STABILITY: SOCIAL INSECURITY
WITHIN THE LAST YEAR, HAVE TO BEEN RAPED OR FORCED TO HAVE ANY KIND OF SEXUAL ACTIVITY BY YOUR PARTNER OR EX-PARTNER?: NO

## 2024-08-12 SDOH — ECONOMIC STABILITY: HOUSING INSECURITY: IN THE PAST 12 MONTHS, HOW MANY TIMES HAVE YOU MOVED WHERE YOU WERE LIVING?: 0

## 2024-08-12 SDOH — HEALTH STABILITY: MENTAL HEALTH: HOW OFTEN DO YOU HAVE 6 OR MORE DRINKS ON ONE OCCASION?: MONTHLY

## 2024-08-12 SDOH — SOCIAL STABILITY: SOCIAL NETWORK
DO YOU BELONG TO ANY CLUBS OR ORGANIZATIONS SUCH AS CHURCH GROUPS UNIONS, FRATERNAL OR ATHLETIC GROUPS, OR SCHOOL GROUPS?: NO

## 2024-08-12 SDOH — HEALTH STABILITY: MENTAL HEALTH: HOW MANY STANDARD DRINKS CONTAINING ALCOHOL DO YOU HAVE ON A TYPICAL DAY?: 5 OR 6

## 2024-08-12 SDOH — SOCIAL STABILITY: SOCIAL INSECURITY
WITHIN THE LAST YEAR, HAVE YOU BEEN KICKED, HIT, SLAPPED, OR OTHERWISE PHYSICALLY HURT BY YOUR PARTNER OR EX-PARTNER?: NO

## 2024-08-12 SDOH — SOCIAL STABILITY: SOCIAL NETWORK: ARE YOU MARRIED, WIDOWED, DIVORCED, SEPARATED, NEVER MARRIED, OR LIVING WITH A PARTNER?: MARRIED

## 2024-08-12 SDOH — ECONOMIC STABILITY: FOOD INSECURITY: WITHIN THE PAST 12 MONTHS, YOU WORRIED THAT YOUR FOOD WOULD RUN OUT BEFORE YOU GOT MONEY TO BUY MORE.: NEVER TRUE

## 2024-08-12 SDOH — HEALTH STABILITY: PHYSICAL HEALTH: ON AVERAGE, HOW MANY DAYS PER WEEK DO YOU ENGAGE IN MODERATE TO STRENUOUS EXERCISE (LIKE A BRISK WALK)?: 0 DAYS

## 2024-08-12 SDOH — SOCIAL STABILITY: SOCIAL INSECURITY: WITHIN THE LAST YEAR, HAVE YOU BEEN AFRAID OF YOUR PARTNER OR EX-PARTNER?: NO

## 2024-08-12 SDOH — HEALTH STABILITY: PHYSICAL HEALTH: ON AVERAGE, HOW MANY MINUTES DO YOU ENGAGE IN EXERCISE AT THIS LEVEL?: 0 MIN

## 2024-08-12 SDOH — HEALTH STABILITY: MENTAL HEALTH
HOW OFTEN DO YOU NEED TO HAVE SOMEONE HELP YOU WHEN YOU READ INSTRUCTIONS, PAMPHLETS, OR OTHER WRITTEN MATERIAL FROM YOUR DOCTOR OR PHARMACY?: SOMETIMES

## 2024-08-12 SDOH — SOCIAL STABILITY: SOCIAL NETWORK: IN A TYPICAL WEEK, HOW MANY TIMES DO YOU TALK ON THE PHONE WITH FAMILY, FRIENDS, OR NEIGHBORS?: NEVER

## 2024-08-12 SDOH — SOCIAL STABILITY: SOCIAL NETWORK: HOW OFTEN DO YOU GET TOGETHER WITH FRIENDS OR RELATIVES?: NEVER

## 2024-08-12 SDOH — ECONOMIC STABILITY: INCOME INSECURITY: HOW HARD IS IT FOR YOU TO PAY FOR THE VERY BASICS LIKE FOOD, HOUSING, MEDICAL CARE, AND HEATING?: NOT HARD AT ALL

## 2024-08-12 SDOH — ECONOMIC STABILITY: TRANSPORTATION INSECURITY
IN THE PAST 12 MONTHS, HAS THE LACK OF TRANSPORTATION KEPT YOU FROM MEDICAL APPOINTMENTS OR FROM GETTING MEDICATIONS?: NO

## 2024-08-12 SDOH — SOCIAL STABILITY: SOCIAL NETWORK: HOW OFTEN DO YOU ATTEND CHURCH OR RELIGIOUS SERVICES?: NEVER

## 2024-08-12 SDOH — ECONOMIC STABILITY: TRANSPORTATION INSECURITY
IN THE PAST 12 MONTHS, HAS LACK OF TRANSPORTATION KEPT YOU FROM MEETINGS, WORK, OR FROM GETTING THINGS NEEDED FOR DAILY LIVING?: NO

## 2024-08-12 ASSESSMENT — PAIN - FUNCTIONAL ASSESSMENT
PAIN_FUNCTIONAL_ASSESSMENT: 0-10

## 2024-08-12 ASSESSMENT — LIFESTYLE VARIABLES
NAUSEA AND VOMITING: NO NAUSEA AND NO VOMITING
AGITATION: 2
VISUAL DISTURBANCES: NOT PRESENT
ORIENTATION AND CLOUDING OF SENSORIUM: ORIENTED AND CAN DO SERIAL ADDITIONS
ANXIETY: MILDLY ANXIOUS
PAROXYSMAL SWEATS: BARELY PERCEPTIBLE SWEATING, PALMS MOIST
NAUSEA AND VOMITING: NO NAUSEA AND NO VOMITING
NAUSEA AND VOMITING: NO NAUSEA AND NO VOMITING
TOTAL SCORE: 7
TREMOR: NO TREMOR
NAUSEA AND VOMITING: NO NAUSEA AND NO VOMITING
VISUAL DISTURBANCES: NOT PRESENT
HEADACHE, FULLNESS IN HEAD: VERY MILD
PAROXYSMAL SWEATS: NO SWEAT VISIBLE
HEADACHE, FULLNESS IN HEAD: MILD
TACTILE DISTURBANCES: VERY MILD ITCHING, PINS AND NEEDLES, BURNING OR NUMBNESS
TREMOR: NO TREMOR
PAROXYSMAL SWEATS: NO SWEAT VISIBLE
HEADACHE, FULLNESS IN HEAD: VERY MILD
AUDIT-C TOTAL SCORE: 8
ORIENTATION AND CLOUDING OF SENSORIUM: CANNOT DO SERIAL ADDITIONS OR IS UNCERTAIN ABOUT DATE
PAROXYSMAL SWEATS: BARELY PERCEPTIBLE SWEATING, PALMS MOIST
AUDITORY DISTURBANCES: NOT PRESENT
NAUSEA AND VOMITING: INTERMITTENT NAUSEA WITH DRY HEAVES
AGITATION: 2
ORIENTATION AND CLOUDING OF SENSORIUM: CANNOT DO SERIAL ADDITIONS OR IS UNCERTAIN ABOUT DATE
TREMOR: 2
ANXIETY: MILDLY ANXIOUS
HEADACHE, FULLNESS IN HEAD: MILD
TREMOR: 2
PAROXYSMAL SWEATS: BARELY PERCEPTIBLE SWEATING, PALMS MOIST
HEADACHE, FULLNESS IN HEAD: MILD
TREMOR: NO TREMOR
AUDITORY DISTURBANCES: NOT PRESENT
ANXIETY: 3
AUDITORY DISTURBANCES: NOT PRESENT
TOTAL SCORE: 10
TACTILE DISTURBANCES: VERY MILD ITCHING, PINS AND NEEDLES, BURNING OR NUMBNESS
ANXIETY: 3
VISUAL DISTURBANCES: NOT PRESENT
VISUAL DISTURBANCES: NOT PRESENT
TOTAL SCORE: 7
NAUSEA AND VOMITING: MILD NAUSEA WITH NO VOMITING
ANXIETY: 2
HEADACHE, FULLNESS IN HEAD: VERY MILD
TOTAL SCORE: 13
TREMOR: 2
ORIENTATION AND CLOUDING OF SENSORIUM: DISORIENTED FOR DATE BY MORE THAN 2 CALENDAR DAYS
VISUAL DISTURBANCES: VERY MILD SENSITIVITY
SKIP TO QUESTIONS 9-10: 0
AUDITORY DISTURBANCES: NOT PRESENT
TOTAL SCORE: 12
AGITATION: MODERATELY FIDGETY AND RESTLESS
AUDITORY DISTURBANCES: NOT PRESENT
AGITATION: 2
AUDITORY DISTURBANCES: NOT PRESENT
PAROXYSMAL SWEATS: NO SWEAT VISIBLE
ANXIETY: 3
ORIENTATION AND CLOUDING OF SENSORIUM: DISORIENTED FOR DATE BY MORE THAN 2 CALENDAR DAYS
TOTAL SCORE: 12
AGITATION: SOMEWHAT MORE THAN NORMAL ACTIVITY
VISUAL DISTURBANCES: NOT PRESENT
AGITATION: 2
ORIENTATION AND CLOUDING OF SENSORIUM: CANNOT DO SERIAL ADDITIONS OR IS UNCERTAIN ABOUT DATE

## 2024-08-12 ASSESSMENT — COGNITIVE AND FUNCTIONAL STATUS - GENERAL
TOILETING: A LITTLE
EATING MEALS: A LITTLE
DRESSING REGULAR UPPER BODY CLOTHING: A LITTLE
STANDING UP FROM CHAIR USING ARMS: A LOT
MOBILITY SCORE: 16
CLIMB 3 TO 5 STEPS WITH RAILING: TOTAL
CLIMB 3 TO 5 STEPS WITH RAILING: TOTAL
PERSONAL GROOMING: A LITTLE
DAILY ACTIVITIY SCORE: 18
TOILETING: A LITTLE
DAILY ACTIVITIY SCORE: 20
DRESSING REGULAR UPPER BODY CLOTHING: A LITTLE
DRESSING REGULAR LOWER BODY CLOTHING: A LITTLE
DRESSING REGULAR LOWER BODY CLOTHING: A LITTLE
STANDING UP FROM CHAIR USING ARMS: A LOT
MOBILITY SCORE: 16
HELP NEEDED FOR BATHING: A LITTLE
WALKING IN HOSPITAL ROOM: TOTAL
WALKING IN HOSPITAL ROOM: TOTAL
HELP NEEDED FOR BATHING: A LITTLE

## 2024-08-12 ASSESSMENT — PAIN SCALES - GENERAL
PAINLEVEL_OUTOF10: 0 - NO PAIN
PAINLEVEL_OUTOF10: 5 - MODERATE PAIN
PAINLEVEL_OUTOF10: 0 - NO PAIN
PAINLEVEL_OUTOF10: 0 - NO PAIN
PAINLEVEL_OUTOF10: 9

## 2024-08-12 ASSESSMENT — PAIN DESCRIPTION - LOCATION: LOCATION: LEG

## 2024-08-12 ASSESSMENT — PAIN SCALES - WONG BAKER: WONGBAKER_NUMERICALRESPONSE: HURTS LITTLE MORE

## 2024-08-12 ASSESSMENT — PAIN DESCRIPTION - ORIENTATION: ORIENTATION: RIGHT;UPPER

## 2024-08-12 ASSESSMENT — ACTIVITIES OF DAILY LIVING (ADL): LACK_OF_TRANSPORTATION: NO

## 2024-08-12 NOTE — CARE PLAN
Problem: Skin  Goal: Decreased wound size/increased tissue granulation at next dressing change  8/12/2024 1846 by Tony Collins RN  Outcome: Progressing  8/12/2024 1845 by Tony Collins RN  Outcome: Progressing  Goal: Participates in plan/prevention/treatment measures  8/12/2024 1846 by Tony Collins RN  Outcome: Progressing  8/12/2024 1845 by Tony Collins RN  Outcome: Progressing  Goal: Prevent/manage excess moisture  8/12/2024 1846 by Tony Collins RN  Outcome: Progressing  8/12/2024 1845 by Tony Collins RN  Outcome: Progressing  Goal: Prevent/minimize sheer/friction injuries  8/12/2024 1846 by Tony Collins RN  Outcome: Progressing  8/12/2024 1845 by Tony Collins RN  Outcome: Progressing  Goal: Promote/optimize nutrition  8/12/2024 1846 by Tony Collins RN  Outcome: Progressing  8/12/2024 1845 by Tony Collins RN  Outcome: Progressing  Goal: Promote skin healing  8/12/2024 1846 by Tony Collins RN  Outcome: Progressing  8/12/2024 1845 by Tony Collins RN  Outcome: Progressing   The patient's goals for the shift include Treat pain , start antibiotic    The clinical goals for the shift include Pain control    Over the shift, the patient did not make progress toward the following goals. Barriers to progression include none. Recommendations to address these barriers include none.

## 2024-08-12 NOTE — NURSING NOTE
Secure message sent to Dr. Winston again, notifying her of Pt Potassium level of 2.9.  Awaiting orders.

## 2024-08-12 NOTE — PROGRESS NOTES
TCC spoke to spouse at bedside. [facility] pt id wheelchair bound at this time. [facility] pt just got prostetic but can not use it yet due to shoulder pain and heart issues. [facility] patient vicky drinks then will go 2-3 weeks without alcohol. [facility] she patient went to AA and rehab in the past but it did not work. [facility]. [facility] patient has POA established. Plan is to return home with no skilled needs     08/12/24 1010   Discharge Planning   Living Arrangements Spouse/significant other   Support Systems Spouse/significant other   Assistance Needed HAs [facility] 4 days a week for 5hours a day. She helsp manage medications   Type of Residence Private residence   Number of Stairs to Enter Residence   (Has ramp)   Number of Stairs Within Residence 2  (wife is having floor raised so patient does not have to worry about steps)   Do you have animals or pets at home? Yes   Who is requesting discharge planning? Provider   Home or Post Acute Services In home services   Type of Home Care Services Home health aide   Expected Discharge Disposition Home   Does the patient need discharge transport arranged? No   RoundTrip coordination needed? No   Financial Resource Strain   How hard is it for you to pay for the very basics like food, housing, medical care, and heating? Not hard   Housing Stability   In the last 12 months, was there a time when you were not able to pay the mortgage or rent on time? N   In the past 12 months, how many times have you moved where you were living? 0   At any time in the past 12 months, were you homeless or living in a shelter (including now)? N   Transportation Needs   In the past 12 months, has lack of transportation kept you from medical appointments or from getting medications? no   In the past 12 months, has lack of transportation kept you from meetings, work, or from getting things needed for daily living? No

## 2024-08-12 NOTE — PROGRESS NOTES
"Lalo Pack is a 73 y.o. male on day 1 of admission presenting with Acute cystitis with hematuria.    Subjective   Patient is complaining of feeling shaky.  He is complaining of having some discomfort in the chest       Objective     Physical Exam  Vitals reviewed.   Constitutional:       Appearance: Normal appearance.   HENT:      Head: Normocephalic and atraumatic.      Right Ear: Tympanic membrane, ear canal and external ear normal.      Left Ear: Tympanic membrane, ear canal and external ear normal.      Nose: Nose normal.      Mouth/Throat:      Pharynx: Oropharynx is clear.   Eyes:      Extraocular Movements: Extraocular movements intact.      Conjunctiva/sclera: Conjunctivae normal.      Pupils: Pupils are equal, round, and reactive to light.   Cardiovascular:      Rate and Rhythm: Regular rhythm. Tachycardia present.      Pulses: Normal pulses.      Heart sounds: Normal heart sounds.   Pulmonary:      Effort: Pulmonary effort is normal.      Breath sounds: Normal breath sounds.   Abdominal:      General: Abdomen is flat. Bowel sounds are normal.      Palpations: Abdomen is soft.   Musculoskeletal:      Cervical back: Normal range of motion and neck supple.   Skin:     General: Skin is warm and dry.   Neurological:      General: No focal deficit present.      Mental Status: He is alert and oriented to person, place, and time.   Psychiatric:         Mood and Affect: Mood normal.         Last Recorded Vitals  Blood pressure 146/83, pulse 96, temperature 36.6 °C (97.9 °F), temperature source Temporal, resp. rate 19, height 1.803 m (5' 10.98\"), weight 91.6 kg (201 lb 15.1 oz), SpO2 99%.  Intake/Output last 3 Shifts:  I/O last 3 completed shifts:  In: 1600 (17.5 mL/kg) [P.O.:1150; IV Piggyback:450]  Out: 1000 (10.9 mL/kg) [Urine:1000 (0.3 mL/kg/hr)]  Weight: 91.6 kg     Relevant Results              Scheduled medications  cefTRIAXone, 1 g, intravenous, q24h  enoxaparin, 40 mg, subcutaneous, q24h CONSUELO  folic " acid, 1 mg, oral, Daily  gabapentin, 300 mg, oral, TID  multivitamin with minerals, 1 tablet, oral, Daily  [START ON 8/13/2024] thiamine, 100 mg, oral, Daily  thiamine, 100 mg, intravenous, Daily      Continuous medications     PRN medications  PRN medications: busPIRone, LORazepam **OR** LORazepam **OR** LORazepam, LORazepam **OR** LORazepam **OR** LORazepam, oxyCODONE  Results for orders placed or performed during the hospital encounter of 08/10/24 (from the past 24 hour(s))   Comprehensive Metabolic Panel   Result Value Ref Range    Glucose 142 (H) 65 - 99 mg/dL    Sodium 136 133 - 145 mmol/L    Potassium 3.2 (L) 3.4 - 5.1 mmol/L    Chloride 104 97 - 107 mmol/L    Bicarbonate 17 (L) 24 - 31 mmol/L    Urea Nitrogen 13 8 - 25 mg/dL    Creatinine 0.90 0.40 - 1.60 mg/dL    eGFR 90 >60 mL/min/1.73m*2    Calcium 8.0 (L) 8.5 - 10.4 mg/dL    Albumin 3.7 3.5 - 5.0 g/dL    Alkaline Phosphatase 90 35 - 125 U/L    Total Protein 6.3 5.9 - 7.9 g/dL    AST 16 5 - 40 U/L    Bilirubin, Total 0.7 0.1 - 1.2 mg/dL    ALT 13 5 - 40 U/L    Anion Gap 15 <=19 mmol/L   CBC   Result Value Ref Range    WBC 12.3 (H) 4.4 - 11.3 x10*3/uL    nRBC 0.0 0.0 - 0.0 /100 WBCs    RBC 4.67 4.50 - 5.90 x10*6/uL    Hemoglobin 13.1 (L) 13.5 - 17.5 g/dL    Hematocrit 39.6 (L) 41.0 - 52.0 %    MCV 85 80 - 100 fL    MCH 28.1 26.0 - 34.0 pg    MCHC 33.1 32.0 - 36.0 g/dL    RDW 15.3 (H) 11.5 - 14.5 %    Platelets 221 150 - 450 x10*3/uL     CT head wo IV contrast    Result Date: 8/10/2024  Interpreted By:  Sylvain Christina, STUDY: CT HEAD WO IV CONTRAST; ;  8/10/2024 3:23 pm   INDICATION: Signs/Symptoms:ams.   COMPARISON: 03/28/2024   ACCESSION NUMBER(S): [id number]   ORDERING CLINICIAN: LAUREN PAINTER   TECHNIQUE: Serial axial unenhanced CT images obtained of the head. Images reformatted in the coronal and sagittal projection.   FINDINGS: Mild parenchymal volume loss with prominence of the ventricles, sulci, and cisterns. Gray-white matter differentiation is  maintained. Mild periventricular white matter change seen nonspecific and likely related to chronic small-vessel ischemic change.   Visualized osseous structures demonstrate unremarkable paranasal sinuses and mastoid air cells.               1. No acute intracranial abnormality   2. Mild parenchymal volume loss     MACRO: None   Signed by: Sylvain Christina 8/10/2024 3:35 PM Dictation workstation:   [id number]    XR chest 1 view    Result Date: 8/10/2024  Interpreted By:  Sylvain Christina, STUDY: XR CHEST 1 VIEW 8/10/2024 3:13 pm   INDICATION: Signs/Symptoms:chest pain   COMPARISON: 08/21/2023   ACCESSION NUMBER(S): [id number]   ORDERING CLINICIAN: KATELYN RODRIGUEZ   TECHNIQUE: Single AP view chest   FINDINGS: Cardiomediastinal silhouette is within normal limits. Aorta is mildly tortuous. Lung fields are hypo inflated with basilar bronchovascular crowding. There is no airspace consolidation. No pleural effusions demonstrated. Visualized osseous structures are unremarkable.             1. Lung fields hypoinflated without acute process.   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      Verónica Winston MD

## 2024-08-12 NOTE — NURSING NOTE
0240- Patient's roommate called staff into room, due to patient sitting on floor. Roommate states that patient did not fall, but crawling on the floor. Patient assisted back to bed. MD on call notified. VSS. Patient stated that he was looking for the shower.

## 2024-08-12 NOTE — PROGRESS NOTES
"Lalo Pack is a 73 y.o. male on day 2 of admission presenting with Acute cystitis with hematuria.    Subjective   Patient is doing little better today more sleepy.  Last night rolled out of the bed without any injury       Objective     Physical Exam  Vitals reviewed.   Constitutional:       Appearance: Normal appearance.   HENT:      Head: Normocephalic and atraumatic.      Right Ear: Tympanic membrane, ear canal and external ear normal.      Left Ear: Tympanic membrane, ear canal and external ear normal.      Nose: Nose normal.      Mouth/Throat:      Pharynx: Oropharynx is clear.   Eyes:      Extraocular Movements: Extraocular movements intact.      Conjunctiva/sclera: Conjunctivae normal.      Pupils: Pupils are equal, round, and reactive to light.   Cardiovascular:      Rate and Rhythm: Normal rate and regular rhythm.      Pulses: Normal pulses.      Heart sounds: Normal heart sounds.   Pulmonary:      Effort: Pulmonary effort is normal.      Breath sounds: Normal breath sounds.   Abdominal:      General: Abdomen is flat. Bowel sounds are normal.      Palpations: Abdomen is soft.   Musculoskeletal:      Cervical back: Normal range of motion and neck supple.      Right lower leg: Edema present.      Comments: Right AKA   Skin:     General: Skin is warm and dry.   Neurological:      General: No focal deficit present.      Mental Status: He is alert and oriented to person, place, and time.   Psychiatric:         Mood and Affect: Mood normal.         Last Recorded Vitals  Blood pressure 132/88, pulse 109, temperature 36.4 °C (97.5 °F), temperature source Temporal, resp. rate 21, height 1.803 m (5' 10.98\"), weight 91.6 kg (201 lb 15.1 oz), SpO2 98%.  Intake/Output last 3 Shifts:  I/O last 3 completed shifts:  In: 1600 (17.5 mL/kg) [P.O.:1150; IV Piggyback:450]  Out: 1000 (10.9 mL/kg) [Urine:1000 (0.3 mL/kg/hr)]  Weight: 91.6 kg     Relevant Results              Scheduled medications  cefTRIAXone, 1 g, " intravenous, q24h  enoxaparin, 40 mg, subcutaneous, q24h CONSUELO  folic acid, 1 mg, oral, Daily  gabapentin, 300 mg, oral, TID  metoprolol succinate XL, 25 mg, oral, q12h  multivitamin with minerals, 1 tablet, oral, Daily  [START ON 8/13/2024] thiamine, 100 mg, oral, Daily      Continuous medications     PRN medications  PRN medications: busPIRone, LORazepam **OR** LORazepam **OR** LORazepam, LORazepam **OR** LORazepam **OR** LORazepam, oxyCODONE  Results for orders placed or performed during the hospital encounter of 08/10/24 (from the past 24 hour(s))   Troponin T   Result Value Ref Range    Troponin T, High Sensitivity 21 (HH) <=14 ng/L   CBC   Result Value Ref Range    WBC 12.0 (H) 4.4 - 11.3 x10*3/uL    nRBC 0.0 0.0 - 0.0 /100 WBCs    RBC 4.70 4.50 - 5.90 x10*6/uL    Hemoglobin 13.1 (L) 13.5 - 17.5 g/dL    Hematocrit 39.9 (L) 41.0 - 52.0 %    MCV 85 80 - 100 fL    MCH 27.9 26.0 - 34.0 pg    MCHC 32.8 32.0 - 36.0 g/dL    RDW 15.0 (H) 11.5 - 14.5 %    Platelets 182 150 - 450 x10*3/uL   Basic Metabolic Panel   Result Value Ref Range    Glucose 115 (H) 65 - 99 mg/dL    Sodium 140 133 - 145 mmol/L    Potassium 2.9 (LL) 3.4 - 5.1 mmol/L    Chloride 105 97 - 107 mmol/L    Bicarbonate 20 (L) 24 - 31 mmol/L    Urea Nitrogen 11 8 - 25 mg/dL    Creatinine 1.00 0.40 - 1.60 mg/dL    eGFR 79 >60 mL/min/1.73m*2    Calcium 8.7 8.5 - 10.4 mg/dL    Anion Gap 15 <=19 mmol/L     ECG 12 lead    Result Date: 8/12/2024  Sinus tachycardia Cannot rule out Anterior infarct , age undetermined Abnormal ECG When compared with ECG of 27-MAR-2024 20:48, Vent. rate has increased BY  38 BPM    CT head wo IV contrast    Result Date: 8/10/2024  Interpreted By:  Sylvain Christina, STUDY: CT HEAD WO IV CONTRAST; ;  8/10/2024 3:23 pm   INDICATION: Signs/Symptoms:ams.   COMPARISON: 03/28/2024   ACCESSION NUMBER(S): [id number]   ORDERING CLINICIAN: LAUREN PAINTER   TECHNIQUE: Serial axial unenhanced CT images obtained of the head. Images reformatted in the  coronal and sagittal projection.   FINDINGS: Mild parenchymal volume loss with prominence of the ventricles, sulci, and cisterns. Gray-white matter differentiation is maintained. Mild periventricular white matter change seen nonspecific and likely related to chronic small-vessel ischemic change.   Visualized osseous structures demonstrate unremarkable paranasal sinuses and mastoid air cells.               1. No acute intracranial abnormality   2. Mild parenchymal volume loss     MACRO: None   Signed by: Sylvain Christina 8/10/2024 3:35 PM Dictation workstation:   [id number]    XR chest 1 view    Result Date: 8/10/2024  Interpreted By:  Sylvain Christina, STUDY: XR CHEST 1 VIEW 8/10/2024 3:13 pm   INDICATION: Signs/Symptoms:chest pain   COMPARISON: 08/21/2023   ACCESSION NUMBER(S): [id number]   ORDERING CLINICIAN: KATELYN RODRIGUEZ   TECHNIQUE: Single AP view chest   FINDINGS: Cardiomediastinal silhouette is within normal limits. Aorta is mildly tortuous. Lung fields are hypo inflated with basilar bronchovascular crowding. There is no airspace consolidation. No pleural effusions demonstrated. Visualized osseous structures are unremarkable.             1. Lung fields hypoinflated without acute process.   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      Verónica Winston MD

## 2024-08-12 NOTE — CONSULTS
Inpatient consult to Cardiology  Consult performed by: Noemi Hillman MD  Consult ordered by: Verónica Winston MD  Reason for consult: Chest pain, coronary disease        History Of Present Illness:    Lalo Pack is a 73 y.o. male presenting with altered mental status and urine tract infection.  Patient with history of coronary disease status post bypass surgery in 2016.  Patient has history of hypertension hyperlipidemia.  Patient currently undergoing treatment with antibiotics.  Less confused today.  I was able to obtain some history from him.  Further history was obtained from his wife at the bedside.  Apparently patient has been complaining of retrosternal chest pain for several months.  Reports pain worse with deep inspiration and cough.  Moderate intensity.  No radiation.  The pain actually reproducible to palpation and can see bruised area on the center of his chest off 2 inches in diameter circular.  Patient denies nausea vomiting diaphoresis.  Has been complaining of chronic cough no fever chills.    Review of systems.  10 point review systems otherwise negative     Last Recorded Vitals:  Vitals:    08/12/24 0313 08/12/24 0735 08/12/24 1125 08/12/24 1500   BP:  105/65 132/88 (!) 149/94   BP Location:  Left arm Left arm Right arm   Patient Position:  Lying Sitting Lying   Pulse: 109      Resp: 21   22   Temp:  36.4 °C (97.5 °F)     TempSrc:  Temporal     SpO2:  98%     Weight:  91.6 kg (201 lb 15.1 oz)     Height:           Last Labs:  CBC - 8/12/2024:  4:25 AM  12.0 13.1 182    39.9      CMP - 8/12/2024:  4:25 AM  8.7 6.3 16 --- 0.7   3.4 3.7 13 90      PTT - 8/21/2023:  1:41 AM  1.0   10.9 24.5     BNP   Date/Time Value Ref Range Status   01/11/2021 12:48  (H) 0 - 99 pg/mL Final     Comment:     .  <100 pg/mL - Heart failure unlikely  100-299 pg/mL - Intermediate probability of acute heart  .               failure exacerbation. Correlate with clinical  .               context and patient  history.    >=300 pg/mL - Heart Failure likely. Correlate with clinical  .               context and patient history.   Biotin interference may cause falsely decreased results.   Patients taking a Biotin dose of up to 5 mg/day should   refrain from taking Biotin for 24 hours before sample   collection. Providers may contact their local laboratory   for further information.       Hemoglobin A1C   Date/Time Value Ref Range Status   01/11/2021 12:48 AM 4.5 % Final     Comment:          Diagnosis of Diabetes-Adults   Non-Diabetic: < or = 5.6%   Increased risk for developing diabetes: 5.7-6.4%   Diagnostic of diabetes: > or = 6.5%  .       Monitoring of Diabetes                Age (y)     Therapeutic Goal (%)   Adults:          >18           <7.0   Pediatrics:    13-18           <7.5                   7-12           <8.0                   0- 6            7.5-8.5   American Diabetes Association. Diabetes Care 33(S1), Jan 2010.     12/20/2020 06:51 AM 5.0 4.0 - 6.0 % Final     Comment:     Hemoglobin A1C levels are related to mean blood glucose during the   preceding 2-3 months. The relationship table below may be used as a   general guide. Each 1% increase in HGB A1C is a reflection of an   increase in mean glucose of approximately 30 mg/dl.   Reference: Diabetes Care, volume 29, supplement 1 Jan. 2006                        HGB A1C ................. Approx. Mean Glucose   _______________________________________________   6%   ...............................  120 mg/dl   7%   ...............................  150 mg/dl   8%   ...............................  180 mg/dl   9%   ...............................  210 mg/dl   10%  ...............................  240 mg/dl  Performed at [place] [address] [address] [facility] [address]        Last I/O:  I/O last 3 completed shifts:  In: 1600 (17.5 mL/kg) [P.O.:1150; IV Piggyback:450]  Out: 1000 (10.9 mL/kg) [Urine:1000 (0.3 mL/kg/hr)]  Weight: 91.6 kg     Past Cardiology Tests (Last  "3 Years):  EKG:  ECG 12 lead 08/10/2024 (Preliminary)    Echo:  No results found for this or any previous visit from the past 1095 days.    Ejection Fractions:  No results found for: \"EF\"  Cath:  No results found for this or any previous visit from the past 1095 days.    Stress Test:  No results found for this or any previous visit from the past 1095 days.    Cardiac Imaging:  No results found for this or any previous visit from the past 1095 days.      Past Medical History:  He has a past medical history of Arthritis, Fracture of unspecified metatarsal bone(s), unspecified foot, initial encounter for closed fracture (01/18/2020), Other specified disorders of bone, lower leg (10/10/2019), Pain in right foot (10/14/2019), Pain, unspecified (04/23/2021), Sleep apnea, and Unspecified fracture of shaft of right tibia, initial encounter for closed fracture (09/23/2021).    Past Surgical History:  He has a past surgical history that includes Amputation; Knee Arthroplasty; Joint replacement; Wrist fusion (Right); and Tumor excision (Left).      Social History:  He reports that he has quit smoking. His smoking use included cigarettes. He has never used smokeless tobacco. He reports current alcohol use of about 6.0 standard drinks of alcohol per week. He reports current drug use. Drug: Marijuana.    Family History:  No family history on file.     Allergies:  Cephalexin, Simvastatin, Triamterene-hydrochlorothiazid, and Ibuprofen    Inpatient Medications:  Scheduled medications   Medication Dose Route Frequency    cefTRIAXone  1 g intravenous q24h    enoxaparin  40 mg subcutaneous q24h CONSUELO    folic acid  1 mg oral Daily    gabapentin  300 mg oral TID    metoprolol succinate XL  25 mg oral q12h    multivitamin with minerals  1 tablet oral Daily    potassium chloride  20 mEq intravenous q2h    [START ON 8/13/2024] thiamine  100 mg oral Daily     PRN medications   Medication    busPIRone    LORazepam    Or    LORazepam    Or    " LORazepam    LORazepam    Or    LORazepam    Or    LORazepam    oxyCODONE     Continuous Medications   Medication Dose Last Rate     Outpatient Medications:  Current Outpatient Medications   Medication Instructions    busPIRone (BUSPAR) 5 mg, oral, 3 times daily PRN    gabapentin (NEURONTIN) 300 mg, oral, 3 times daily    oxyCODONE (ROXICODONE) 5 mg, oral, Every 8 hours PRN       Physical Exam:  General: Patient is in no acute distress.  HEENT: atraumatic normocephalic.  Neck: is supple jugular venous pressure within normal limits no thyromegaly.  Cardiovascular regular rate and rhythm normal heart sounds no murmurs rubs or gallops.  Lungs: clear to auscultation bilaterally.  Abdomen: is soft nontender.  Extremities warm to touch no edema.  Neurologic examination: patient is awake alert oriented to person, place, date/time.  Psychiatric examination: patient has good insight denies feeling suicidal and depressed.  Pulses 2+ intact bilaterally     Assessment/Plan   #1 chest pain.  At least at the time being his chest pain appears musculoskeletal and reproducible to palpation.  Patient has previous history of coronary disease status post CABG in 2016.  His wife told me that [facility] and their plan is to do cardiac catheterization within the next few weeks for this recurrent chronic chest pain.  For the time being his current chest pain is musculoskeletal I recommend to [facility] with the [facility].  Continue aggressive risk factor modifications with aspirin 81 mg oral daily, high intensity statin and blood pressure control.  Discussed in length with his wife importance of [facility].    2.  Hypertension restart home medications will monitor.    3.  Hyperlipidemia recommend high intensity statin.  Will monitor as an outpatient LDL as well as triglycerides.    4.  Urine tract infection currently on antibiotics management by primary team.    5.  Altered mental status likely  secondary urinary tract infection.  No apparent focal neurologic deficit.  Patient mental status improving with antibiotics.    Thank you for the consultation    Code Status:  Full Code      Noemi Hillman MD

## 2024-08-12 NOTE — CARE PLAN
The patient's goals for the shift include Treat pain , start antibiotic    The clinical goals for the shift include Pain control    Over the shift, the patient did not make progress toward the following goals. Barriers to progression include none. Recommendations to address these barriers include none  Problem: Pain - Adult  Goal: Verbalizes/displays adequate comfort level or baseline comfort level  Outcome: Progressing     Problem: Safety - Adult  Goal: Free from fall injury  Outcome: Progressing     Problem: Discharge Planning  Goal: Discharge to home or other facility with appropriate resources  Outcome: Progressing     Problem: Chronic Conditions and Co-morbidities  Goal: Patient's chronic conditions and co-morbidity symptoms are monitored and maintained or improved  Outcome: Progressing     Problem: Pain  Goal: Takes deep breaths with improved pain control throughout the shift  Outcome: Progressing  Goal: Turns in bed with improved pain control throughout the shift  Outcome: Progressing  Goal: Walks with improved pain control throughout the shift  Outcome: Progressing  Goal: Performs ADL's with improved pain control throughout shift  Outcome: Progressing  Goal: Participates in PT with improved pain control throughout the shift  Outcome: Progressing  Goal: Free from opioid side effects throughout the shift  Outcome: Progressing  Goal: Free from acute confusion related to pain meds throughout the shift  Outcome: Progressing     Problem: Skin  Goal: Decreased wound size/increased tissue granulation at next dressing change  Outcome: Progressing  Goal: Participates in plan/prevention/treatment measures  Outcome: Progressing  Goal: Prevent/manage excess moisture  Outcome: Progressing  Goal: Prevent/minimize sheer/friction injuries  Outcome: Progressing  Goal: Promote/optimize nutrition  Outcome: Progressing  Goal: Promote skin healing  Outcome: Progressing   .

## 2024-08-13 ENCOUNTER — APPOINTMENT (OUTPATIENT)
Dept: CARDIOLOGY | Facility: HOSPITAL | Age: 74
DRG: 894 | End: 2024-08-13
Payer: COMMERCIAL

## 2024-08-13 VITALS
HEART RATE: 107 BPM | DIASTOLIC BLOOD PRESSURE: 105 MMHG | OXYGEN SATURATION: 98 % | BODY MASS INDEX: 28.3 KG/M2 | HEIGHT: 71 IN | SYSTOLIC BLOOD PRESSURE: 153 MMHG | WEIGHT: 202.16 LBS | TEMPERATURE: 97.9 F | RESPIRATION RATE: 18 BRPM

## 2024-08-13 LAB
ANION GAP SERPL CALC-SCNC: 13 MMOL/L
BUN SERPL-MCNC: 8 MG/DL (ref 8–25)
CALCIUM SERPL-MCNC: 8.5 MG/DL (ref 8.5–10.4)
CHLORIDE SERPL-SCNC: 107 MMOL/L (ref 97–107)
CO2 SERPL-SCNC: 20 MMOL/L (ref 24–31)
CREAT SERPL-MCNC: 0.9 MG/DL (ref 0.4–1.6)
EGFRCR SERPLBLD CKD-EPI 2021: 90 ML/MIN/1.73M*2
ERYTHROCYTE [DISTWIDTH] IN BLOOD BY AUTOMATED COUNT: 15.7 % (ref 11.5–14.5)
GLUCOSE SERPL-MCNC: 87 MG/DL (ref 65–99)
HCT VFR BLD AUTO: 39.7 % (ref 41–52)
HGB BLD-MCNC: 12.8 G/DL (ref 13.5–17.5)
MAGNESIUM SERPL-MCNC: 1.8 MG/DL (ref 1.6–3.1)
MCH RBC QN AUTO: 27.9 PG (ref 26–34)
MCHC RBC AUTO-ENTMCNC: 32.2 G/DL (ref 32–36)
MCV RBC AUTO: 87 FL (ref 80–100)
NRBC BLD-RTO: 0 /100 WBCS (ref 0–0)
PLATELET # BLD AUTO: 210 X10*3/UL (ref 150–450)
POTASSIUM SERPL-SCNC: 3.5 MMOL/L (ref 3.4–5.1)
RBC # BLD AUTO: 4.59 X10*6/UL (ref 4.5–5.9)
SODIUM SERPL-SCNC: 140 MMOL/L (ref 133–145)
WBC # BLD AUTO: 11.2 X10*3/UL (ref 4.4–11.3)

## 2024-08-13 PROCEDURE — 2500000001 HC RX 250 WO HCPCS SELF ADMINISTERED DRUGS (ALT 637 FOR MEDICARE OP)

## 2024-08-13 PROCEDURE — 2500000004 HC RX 250 GENERAL PHARMACY W/ HCPCS (ALT 636 FOR OP/ED)

## 2024-08-13 PROCEDURE — 93005 ELECTROCARDIOGRAM TRACING: CPT

## 2024-08-13 PROCEDURE — 2500000001 HC RX 250 WO HCPCS SELF ADMINISTERED DRUGS (ALT 637 FOR MEDICARE OP): Performed by: INTERNAL MEDICINE

## 2024-08-13 PROCEDURE — 83735 ASSAY OF MAGNESIUM: CPT | Performed by: INTERNAL MEDICINE

## 2024-08-13 PROCEDURE — 85027 COMPLETE CBC AUTOMATED: CPT | Performed by: INTERNAL MEDICINE

## 2024-08-13 PROCEDURE — 99233 SBSQ HOSP IP/OBS HIGH 50: CPT | Performed by: INTERNAL MEDICINE

## 2024-08-13 PROCEDURE — 36415 COLL VENOUS BLD VENIPUNCTURE: CPT | Performed by: INTERNAL MEDICINE

## 2024-08-13 PROCEDURE — 80048 BASIC METABOLIC PNL TOTAL CA: CPT | Performed by: INTERNAL MEDICINE

## 2024-08-13 PROCEDURE — 99232 SBSQ HOSP IP/OBS MODERATE 35: CPT | Performed by: INTERNAL MEDICINE

## 2024-08-13 PROCEDURE — 2500000004 HC RX 250 GENERAL PHARMACY W/ HCPCS (ALT 636 FOR OP/ED): Performed by: INTERNAL MEDICINE

## 2024-08-13 RX ORDER — LEVETIRACETAM 500 MG/1
1000 TABLET ORAL
COMMUNITY
Start: 2024-05-10

## 2024-08-13 RX ADMIN — LORAZEPAM 1 MG: 1 TABLET ORAL at 11:54

## 2024-08-13 RX ADMIN — OXYCODONE 5 MG: 5 TABLET ORAL at 03:27

## 2024-08-13 RX ADMIN — ENOXAPARIN SODIUM 40 MG: 40 INJECTION SUBCUTANEOUS at 08:45

## 2024-08-13 RX ADMIN — ASPIRIN 81 MG: 81 TABLET, COATED ORAL at 08:44

## 2024-08-13 RX ADMIN — LORAZEPAM 2 MG: 1 TABLET ORAL at 03:27

## 2024-08-13 RX ADMIN — Medication 100 MG: at 08:44

## 2024-08-13 RX ADMIN — FOLIC ACID 1 MG: 1 TABLET ORAL at 08:44

## 2024-08-13 RX ADMIN — LORAZEPAM 2 MG: 1 TABLET ORAL at 15:08

## 2024-08-13 RX ADMIN — LORAZEPAM 2 MG: 2 INJECTION INTRAMUSCULAR; INTRAVENOUS at 10:23

## 2024-08-13 RX ADMIN — GABAPENTIN 300 MG: 300 CAPSULE ORAL at 08:45

## 2024-08-13 RX ADMIN — OXYCODONE 5 MG: 5 TABLET ORAL at 11:54

## 2024-08-13 RX ADMIN — GABAPENTIN 300 MG: 300 CAPSULE ORAL at 15:07

## 2024-08-13 RX ADMIN — LORAZEPAM 0.5 MG: 0.5 TABLET ORAL at 08:44

## 2024-08-13 RX ADMIN — Medication 1 TABLET: at 08:44

## 2024-08-13 RX ADMIN — METOPROLOL SUCCINATE 50 MG: 50 TABLET, EXTENDED RELEASE ORAL at 08:44

## 2024-08-13 RX ADMIN — LORAZEPAM 2 MG: 2 INJECTION INTRAMUSCULAR; INTRAVENOUS at 00:45

## 2024-08-13 ASSESSMENT — LIFESTYLE VARIABLES
TACTILE DISTURBANCES: MILD ITCHING, PINS AND NEEDLES, BURNING OR NUMBNESS
AGITATION: MODERATELY FIDGETY AND RESTLESS
ORIENTATION AND CLOUDING OF SENSORIUM: CANNOT DO SERIAL ADDITIONS OR IS UNCERTAIN ABOUT DATE
AUDITORY DISTURBANCES: NOT PRESENT
VISUAL DISTURBANCES: NOT PRESENT
AUDITORY DISTURBANCES: NOT PRESENT
AGITATION: SOMEWHAT MORE THAN NORMAL ACTIVITY
AGITATION: SOMEWHAT MORE THAN NORMAL ACTIVITY
TACTILE DISTURBANCES: VERY MILD ITCHING, PINS AND NEEDLES, BURNING OR NUMBNESS
TREMOR: NOT VISIBLE, BUT CAN BE FELT FINGERTIP TO FINGERTIP
VISUAL DISTURBANCES: NOT PRESENT
ANXIETY: 3
PAROXYSMAL SWEATS: NO SWEAT VISIBLE
ANXIETY: 2
PAROXYSMAL SWEATS: NO SWEAT VISIBLE
ORIENTATION AND CLOUDING OF SENSORIUM: CANNOT DO SERIAL ADDITIONS OR IS UNCERTAIN ABOUT DATE
ORIENTATION AND CLOUDING OF SENSORIUM: DISORIENTED FOR DATE BY NO MORE THAN 2 CALENDAR DAYS
PAROXYSMAL SWEATS: NO SWEAT VISIBLE
HEADACHE, FULLNESS IN HEAD: MILD
TREMOR: 2
VISUAL DISTURBANCES: NOT PRESENT
ORIENTATION AND CLOUDING OF SENSORIUM: DISORIENTED FOR DATE BY NO MORE THAN 2 CALENDAR DAYS
ANXIETY: MODERATELY ANXIOUS, OR GUARDED, SO ANXIETY IS INFERRED
ORIENTATION AND CLOUDING OF SENSORIUM: CANNOT DO SERIAL ADDITIONS OR IS UNCERTAIN ABOUT DATE
PAROXYSMAL SWEATS: BARELY PERCEPTIBLE SWEATING, PALMS MOIST
TREMOR: NO TREMOR
NAUSEA AND VOMITING: NO NAUSEA AND NO VOMITING
TOTAL SCORE: 9
PAROXYSMAL SWEATS: NO SWEAT VISIBLE
AGITATION: SOMEWHAT MORE THAN NORMAL ACTIVITY
HEADACHE, FULLNESS IN HEAD: MILD
VISUAL DISTURBANCES: NOT PRESENT
HEADACHE, FULLNESS IN HEAD: MILD
TREMOR: NOT VISIBLE, BUT CAN BE FELT FINGERTIP TO FINGERTIP
NAUSEA AND VOMITING: NO NAUSEA AND NO VOMITING
AUDITORY DISTURBANCES: NOT PRESENT
TACTILE DISTURBANCES: VERY MILD ITCHING, PINS AND NEEDLES, BURNING OR NUMBNESS
ANXIETY: 3
AGITATION: SOMEWHAT MORE THAN NORMAL ACTIVITY
NAUSEA AND VOMITING: MILD NAUSEA WITH NO VOMITING
TOTAL SCORE: 13
HEADACHE, FULLNESS IN HEAD: MILD
TOTAL SCORE: 6
ORIENTATION AND CLOUDING OF SENSORIUM: CANNOT DO SERIAL ADDITIONS OR IS UNCERTAIN ABOUT DATE
ANXIETY: MODERATELY ANXIOUS, OR GUARDED, SO ANXIETY IS INFERRED
PAROXYSMAL SWEATS: BARELY PERCEPTIBLE SWEATING, PALMS MOIST
TOTAL SCORE: 12
HEADACHE, FULLNESS IN HEAD: MILD
TOTAL SCORE: 15
AUDITORY DISTURBANCES: NOT PRESENT
NAUSEA AND VOMITING: NO NAUSEA AND NO VOMITING
AGITATION: SOMEWHAT MORE THAN NORMAL ACTIVITY
NAUSEA AND VOMITING: NO NAUSEA AND NO VOMITING
TREMOR: 3
AUDITORY DISTURBANCES: NOT PRESENT
BLOOD PRESSURE: 142/84
VISUAL DISTURBANCES: NOT PRESENT
NAUSEA AND VOMITING: NO NAUSEA AND NO VOMITING
VISUAL DISTURBANCES: NOT PRESENT
TACTILE DISTURBANCES: MODERATE ITCHING, PINS AND NEEDLES, BURNING OR NUMBNESS
BLOOD PRESSURE: 140/83
TACTILE DISTURBANCES: MODERATE ITCHING, PINS AND NEEDLES, BURNING OR NUMBNESS
HEADACHE, FULLNESS IN HEAD: VERY MILD
TREMOR: NOT VISIBLE, BUT CAN BE FELT FINGERTIP TO FINGERTIP
AUDITORY DISTURBANCES: NOT PRESENT
ANXIETY: 2
TOTAL SCORE: 12

## 2024-08-13 ASSESSMENT — COGNITIVE AND FUNCTIONAL STATUS - GENERAL
WALKING IN HOSPITAL ROOM: TOTAL
MOBILITY SCORE: 16
DAILY ACTIVITIY SCORE: 18
PERSONAL GROOMING: A LITTLE
STANDING UP FROM CHAIR USING ARMS: A LOT
DRESSING REGULAR UPPER BODY CLOTHING: A LITTLE
MOBILITY SCORE: 16
STANDING UP FROM CHAIR USING ARMS: A LOT
CLIMB 3 TO 5 STEPS WITH RAILING: TOTAL
DRESSING REGULAR LOWER BODY CLOTHING: A LITTLE
WALKING IN HOSPITAL ROOM: TOTAL
HELP NEEDED FOR BATHING: A LITTLE
TOILETING: A LITTLE
CLIMB 3 TO 5 STEPS WITH RAILING: TOTAL
EATING MEALS: A LITTLE

## 2024-08-13 ASSESSMENT — PAIN SCALES - GENERAL
PAINLEVEL_OUTOF10: 0 - NO PAIN
PAINLEVEL_OUTOF10: 9
PAINLEVEL_OUTOF10: 6
PAINLEVEL_OUTOF10: 4

## 2024-08-13 ASSESSMENT — PAIN - FUNCTIONAL ASSESSMENT
PAIN_FUNCTIONAL_ASSESSMENT: 0-10
PAIN_FUNCTIONAL_ASSESSMENT: 0-10
PAIN_FUNCTIONAL_ASSESSMENT: FLACC (FACE, LEGS, ACTIVITY, CRY, CONSOLABILITY)
PAIN_FUNCTIONAL_ASSESSMENT: 0-10

## 2024-08-13 ASSESSMENT — PAIN DESCRIPTION - DESCRIPTORS
DESCRIPTORS: ACHING;SORE
DESCRIPTORS: ACHING;SORE

## 2024-08-13 NOTE — NURSING NOTE
Pt found sitting at edge of bed with sitter in room.  Pt very stable sitting up.  It was reported to me that Pt was confused overnight. Pt is A&OX3 at this time.  Pt states an aching pain and soreness in the right midsternal area.  Pt has a round bruise, about the size of a quarter in that area. Pt advised that pain medication is not available until about 1130. Pt has no other complaints at this time.

## 2024-08-13 NOTE — PROGRESS NOTES
Per RN patient and spouse [facility] sent clinicals via Fax to intake awaiting response     ** do not discharge without speaking to care coordination**    Shannon Peacock RN       Yes

## 2024-08-13 NOTE — NURSING NOTE
Patient and wife decided that they want to leave do to patient pain and anxiety.  He is very upset and want to go home.  Called Dr. Winston and she spoke with the wife and the wife agreed to take him home AMA.  AMHEAVENLY formed signed and placed in chart.

## 2024-08-13 NOTE — PROGRESS NOTES
"Lalo Pack is a 73 y.o. male on day 3 of admission presenting with Acute cystitis with hematuria.    Subjective   Patient still complaining of chest pain.  Thinks it is from his heart.  Still very shaky       Objective     Physical Exam  Vitals reviewed.   Constitutional:       Appearance: Normal appearance.   HENT:      Head: Normocephalic and atraumatic.      Right Ear: Tympanic membrane, ear canal and external ear normal.      Left Ear: Tympanic membrane, ear canal and external ear normal.      Nose: Nose normal.      Mouth/Throat:      Pharynx: Oropharynx is clear.   Eyes:      Extraocular Movements: Extraocular movements intact.      Conjunctiva/sclera: Conjunctivae normal.      Pupils: Pupils are equal, round, and reactive to light.   Cardiovascular:      Rate and Rhythm: Normal rate and regular rhythm.      Pulses: Normal pulses.      Heart sounds: Normal heart sounds.   Pulmonary:      Effort: Pulmonary effort is normal.      Breath sounds: Normal breath sounds.   Abdominal:      General: Abdomen is flat. Bowel sounds are normal.      Palpations: Abdomen is soft.   Musculoskeletal:      Cervical back: Normal range of motion and neck supple.      Right lower leg: Edema present.      Comments: Right AKA  Tremors present   Skin:     General: Skin is warm and dry.   Neurological:      General: No focal deficit present.      Mental Status: He is alert and oriented to person, place, and time.   Psychiatric:         Mood and Affect: Mood normal.         Last Recorded Vitals  Blood pressure (!) 131/91, pulse 96, temperature 36.6 °C (97.9 °F), temperature source Temporal, resp. rate 20, height 1.803 m (5' 10.98\"), weight 91.7 kg (202 lb 2.6 oz), SpO2 97%.  Intake/Output last 3 Shifts:  I/O last 3 completed shifts:  In: 460 (5 mL/kg) [P.O.:360; IV Piggyback:100]  Out: 2450 (26.7 mL/kg) [Urine:2450 (0.7 mL/kg/hr)]  Weight: 91.7 kg     Relevant Results              Scheduled medications  aspirin, 81 mg, oral, " Daily  cefTRIAXone, 1 g, intravenous, q24h  enoxaparin, 40 mg, subcutaneous, q24h CONSUELO  folic acid, 1 mg, oral, Daily  gabapentin, 300 mg, oral, TID  metoprolol succinate XL, 50 mg, oral, q12h  multivitamin with minerals, 1 tablet, oral, Daily  rosuvastatin, 10 mg, oral, Nightly  thiamine, 100 mg, oral, Daily      Continuous medications     PRN medications  PRN medications: busPIRone, LORazepam **OR** LORazepam **OR** LORazepam, LORazepam **OR** LORazepam **OR** LORazepam, oxyCODONE  Results for orders placed or performed during the hospital encounter of 08/10/24 (from the past 24 hour(s))   CBC   Result Value Ref Range    WBC 11.2 4.4 - 11.3 x10*3/uL    nRBC 0.0 0.0 - 0.0 /100 WBCs    RBC 4.59 4.50 - 5.90 x10*6/uL    Hemoglobin 12.8 (L) 13.5 - 17.5 g/dL    Hematocrit 39.7 (L) 41.0 - 52.0 %    MCV 87 80 - 100 fL    MCH 27.9 26.0 - 34.0 pg    MCHC 32.2 32.0 - 36.0 g/dL    RDW 15.7 (H) 11.5 - 14.5 %    Platelets 210 150 - 450 x10*3/uL   Basic Metabolic Panel   Result Value Ref Range    Glucose 87 65 - 99 mg/dL    Sodium 140 133 - 145 mmol/L    Potassium 3.5 3.4 - 5.1 mmol/L    Chloride 107 97 - 107 mmol/L    Bicarbonate 20 (L) 24 - 31 mmol/L    Urea Nitrogen 8 8 - 25 mg/dL    Creatinine 0.90 0.40 - 1.60 mg/dL    eGFR 90 >60 mL/min/1.73m*2    Calcium 8.5 8.5 - 10.4 mg/dL    Anion Gap 13 <=19 mmol/L   Magnesium   Result Value Ref Range    Magnesium 1.80 1.60 - 3.10 mg/dL     No results found.                   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)    Above-knee amputation (Multi)    Alcohol intoxication (CMS-HCC)    Hx of CABG    Chest pain    Tachycardia    Hypokalemia    potassium okay  Urine cultures contaminated  Will repeat urine  Continue CIWA protocol for DTs  Patient is still tremulous  Spoke to the nurses patient is requiring increased dose of Ativan today  Last night he was really bad  Cardiology  Chest  pain attributed to musculoskeletal  Patient is upset about that because he is complaining of chest pain still and he states it starts at his incision site and it is from his heart       I spent  minutes in the professional and overall care of this patient.      Verónica Winston MD

## 2024-08-13 NOTE — NURSING NOTE
Pt's wife at bedside.  Pt is visibly more agitated and anxious than earlier today.  CIWA assessment done again.  Medicated with Ativan per order.

## 2024-08-13 NOTE — PROGRESS NOTES
Spiritual Care Visit    Clinical Encounter Type  Visited With: Patient  Routine Visit: Introduction    Cheondoism Encounters  Cheondoism Needs: Prayer     Spiritual care Note:     Patient received a visit from the Spiritual care volunteer while admitted.  This patient was triaged for their emotional and spiritual need consistent with their belief system and supported accordingly.

## 2024-08-13 NOTE — PROGRESS NOTES
Subjective Data:  Patient is doing good.  Still complaining of chest pain mechanical worse with deep inspiration.  Worse with cough.    Overnight Events:    Telemetry overnight reviewed no events     Objective Data:  Last Recorded Vitals:  Vitals:    08/13/24 0405 08/13/24 0615 08/13/24 0658 08/13/24 0800   BP:   140/83 140/83   BP Location:   Right arm    Patient Position:   Lying    Pulse: 101  99    Resp: 17      Temp: 36.9 °C (98.4 °F)  35.8 °C (96.4 °F)    TempSrc: Oral  Temporal    SpO2: 98%  95%    Weight:  91.7 kg (202 lb 2.6 oz)     Height:           Last Labs:  CBC - 8/13/2024:  4:28 AM  11.2 12.8 210    39.7      CMP - 8/13/2024:  4:28 AM  8.5 6.3 16 --- 0.7   3.4 3.7 13 90      PTT - 8/21/2023:  1:41 AM  1.0   10.9 24.5     BNP   Date/Time Value Ref Range Status   01/11/2021 12:48  0 - 99 pg/mL Final     Comment:     .  <100 pg/mL - Heart failure unlikely  100-299 pg/mL - Intermediate probability of acute heart  .               failure exacerbation. Correlate with clinical  .               context and patient history.    >=300 pg/mL - Heart Failure likely. Correlate with clinical  .               context and patient history.   Biotin interference may cause falsely decreased results.   Patients taking a Biotin dose of up to 5 mg/day should   refrain from taking Biotin for 24 hours before sample   collection. Providers may contact their local laboratory   for further information.       HGBA1C   Date/Time Value Ref Range Status   01/11/2021 12:48 AM 4.5 % Final     Comment:          Diagnosis of Diabetes-Adults   Non-Diabetic: < or = 5.6%   Increased risk for developing diabetes: 5.7-6.4%   Diagnostic of diabetes: > or = 6.5%  .       Monitoring of Diabetes                Age (y)     Therapeutic Goal (%)   Adults:          >18           <7.0   Pediatrics:    13-18           <7.5                   7-12           <8.0                   0- 6            7.5-8.5   American Diabetes Association. Diabetes  "Care 33(S1), Jan 2010.     12/20/2020 06:51 AM 5.0 4.0 - 6.0 % Final     Comment:     Hemoglobin A1C levels are related to mean blood glucose during the   preceding 2-3 months. The relationship table below may be used as a   general guide. Each 1% increase in HGB A1C is a reflection of an   increase in mean glucose of approximately 30 mg/dl.   Reference: Diabetes Care, volume 29, supplement 1 Jan. 2006                        HGB A1C ................. Approx. Mean Glucose   _______________________________________________   6%   ...............................  120 mg/dl   7%   ...............................  150 mg/dl   8%   ...............................  180 mg/dl   9%   ...............................  210 mg/dl   10%  ...............................  240 mg/dl  Performed at [facility] [id number] [facility] [address]        Last I/O:  I/O last 3 completed shifts:  In: 460 (5 mL/kg) [P.O.:360; IV Piggyback:100]  Out: 2450 (26.7 mL/kg) [Urine:2450 (0.7 mL/kg/hr)]  Weight: 91.7 kg     Past Cardiology Tests (Last 3 Years):  EKG:  ECG 12 lead 08/10/2024    Echo:  No results found for this or any previous visit from the past 1095 days.    Ejection Fractions:  No results found for: \"EF\"  Cath:  No results found for this or any previous visit from the past 1095 days.    Stress Test:  No results found for this or any previous visit from the past 1095 days.    Cardiac Imaging:  No results found for this or any previous visit from the past 1095 days.      Inpatient Medications:  Scheduled medications   Medication Dose Route Frequency    aspirin  81 mg oral Daily    cefTRIAXone  1 g intravenous q24h    enoxaparin  40 mg subcutaneous q24h CONSUELO    folic acid  1 mg oral Daily    gabapentin  300 mg oral TID    metoprolol succinate XL  50 mg oral q12h    multivitamin with minerals  1 tablet oral Daily    rosuvastatin  10 mg oral Nightly    thiamine  100 mg oral Daily     PRN medications   Medication    busPIRone    LORazepam "    Or    LORazepam    Or    LORazepam    LORazepam    Or    LORazepam    Or    LORazepam    oxyCODONE     Continuous Medications   Medication Dose Last Rate       Physical Exam:  General: Patient is in no acute distress.  HEENT: atraumatic normocephalic.  Neck: is supple jugular venous pressure within normal limits no thyromegaly.  Cardiovascular regular rate and rhythm normal heart sounds no murmurs rubs or gallops.  Lungs: clear to auscultation bilaterally.  Abdomen: is soft nontender.  Extremities warm to touch no edema.          Assessment/Plan  #1 chest pain.  At least at the time being his chest pain appears musculoskeletal and reproducible to palpation.  Patient has previous history of coronary disease status post CABG in 2016.  His wife told me that [facility] and their plan is to do cardiac catheterization within the next few weeks for this recurrent chronic chest pain.  For the time being his current chest pain is musculoskeletal I recommend to [facility] with the [facility].  Continue aggressive risk factor modifications with aspirin 81 mg oral daily, high intensity statin and blood pressure control.  Discussed in length with his wife importance of [facility].     2.  Hypertension restart home medications will monitor.  We may increase metoprolol in a.m. if his heart rate remains elevated.     3.  Hyperlipidemia recommend high intensity statin.  Will monitor as an outpatient LDL as well as triglycerides.     4.  Urine tract infection currently on antibiotics management by primary team.     5.  Altered mental status likely secondary urinary tract infection.  No apparent focal neurologic deficit.  Patient mental status improving with antibiotics.     Thank you for the consultation     Peripheral IV 08/12/24 22 G Left;Posterior;Upper Forearm (Active)   Site Assessment Intact;Dry;Clean 08/13/24 0800   Dressing Type Transparent;Securing device 08/13/24 0800   Line Status  Flushed;Saline locked 08/13/24 0800   Dressing Status Clean;Dry 08/13/24 0800   Number of days: 1       Code Status:  Full Code      Noemi Hillman MD

## 2024-08-13 NOTE — CARE PLAN
Problem: Pain - Adult  Goal: Verbalizes/displays adequate comfort level or baseline comfort level  Outcome: Progressing   The patient's goals for the shift include Treat pain , start antibiotic    The clinical goals for the shift include Pain control    Over the shift, the patient did not make progress toward the following goals. Barriers to progression include . Recommendations to address these barriers include .

## 2024-08-14 LAB
BACTERIA BLD CULT: NORMAL
BACTERIA BLD CULT: NORMAL

## 2024-08-14 NOTE — DISCHARGE SUMMARY
Discharge Diagnosis  Acute cystitis with hematuria    Issues Requiring Follow-Up  Patient left AMA    Test Results Pending At Discharge  Pending Labs       Order Current Status    Blood Culture Preliminary result    Blood Culture Preliminary result            Hospital Course   Lalo Pack is a 73 y.o. male presenting with unresponsive episode.  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  Patient was brought to the EMS when wife called as patient was unresponsive.  Apparently patient is drinking heavily for the past few days.  In the ER patient was found to have alcohol intoxication initially somnolent and minimally responsive but later patient was alert oriented.  Patient had right AKA from septic prosthetic joint and to control the phantom pain he takes narcotics and alcohol .  He has quit smoking long time ago.  Patient mobilizes using a wheelchair and drinks regularly  Patient was found to have UTI in ER  Patient was treated for UTI and alcohol withdrawal  Patient left AMA.  Did talk to the wife and she was not able to convince him.  She thought that it is best for him to leave the hospital.  [facility] again    Pertinent Physical Exam At Time of Discharge  Physical Exam    Home Medications     Medication List      ASK your doctor about these medications     busPIRone 10 mg tablet; Commonly known as: Buspar; Take 0.5 tablets (5   mg) by mouth 3 times a day as needed (anxiety).   gabapentin 300 mg capsule; Commonly known as: Neurontin; Take 1 capsule   (300 mg) by mouth 3 times a day.   levETIRAcetam 500 mg tablet; Commonly known as: Keppra   oxyCODONE 5 mg immediate release tablet; Commonly known as: Roxicodone;   Take 1 tablet (5 mg) by mouth every 8 hours if needed for severe pain (7 -   10).       Outpatient Follow-Up  Future Appointments   Date Time Provider Department Center   9/5/2024 10:45 AM Rickey  FLORENCE Wadsworth MD [id number]       Verónica Winston MD

## 2024-08-14 NOTE — DOCUMENTATION CLARIFICATION NOTE
"    PATIENT:               FABIAAN HIDALGO  ACCT #:                  [id number]  MRN:                       [id number]  :                       1950  ADMIT DATE:       8/10/2024 2:51 PM  DISCH DATE:        2024 7:59 PM  RESPONDING PROVIDER #:        [id number]          PROVIDER RESPONSE TEXT:    Sepsis secondary to UTI with acute organ dysfunction of Lactic Acidosis ruled out after workup    CDI QUERY TEXT:    Clarification    Instruction:    Based on your assessment of the patient and the clinical information, please provide the requested documentation by clicking on the appropriate radio button and enter any additional information if prompted.    Question: Please further clarify the diagnosis of Sepsis as    When answering this query, please exercise your independent professional judgment. The fact that a question is being asked, does not imply that any particular answer is desired or expected.    The patient's clinical indicators include:  Clinical Information: 73y.o. M presents via EMS for unresponsiveness with alcohol intoxication.  Per H/P, admitted for Acute cystitis with hematuria, Phantom pain after above-knee amputation and Alcohol intoxication.  VS: 36.6, 117, 18, 148/95 Map 113, 95 percent on RA    8/10 ED Provider Note: \" Diagnoses as of 8/10/24 at 1706: Acute cystitis with hematuria. Lactic Acidosis, Sepsis due to unspecified organism, ETOH abuse. \"    8/10 H/P: \" Acute cystitis with hematuria. Phantom pain after above-knee amputation. Alcohol intoxication. Start IV antibiotics for UTI. \"     Progress Note: \" Chest pain, Tachycardia. Add Toprol-XL. Tachycardia probably from withdrawal. \"    Clinical Indicators:  8/10 HR: 117, 113, 115, 124, 117, 125, 115, 127, 125, 128, 120  8/10 RR: 22, 21, 23   HR: 116, 110, 93, 96, 120, 115   RR: 21   HR: 109   RR: 21    8/10 Alcohol level: 0.368  8/10 at 1503 Troponin T- 25  8/10 at 1750 Troponin T- 23  8/10 at 1943 Troponin T- 26  8/10 " Venous Lactate: 8.8, 6.9    8/10 H/H 15.4/47.8, WBC 13.0, Plts 316  8/11 H/H 13.1/39.6, WBC 12.3, Plts 221  8/12 H/H 13.1/39.9, WBC 12.0, Plts 182    8/10 Creatinine 1.00, GFR 79, BUN 14, Bicarb 13, Anion Gap 19, K 3.7  8/11 Creatinine 0.90, GFR 90, BUN 13, Bicarb 17, Anion Gap 15, K 3.2  8/12 Creatinine 1.00, GFR 79, BUN 11, Bicarb 20, Anion Gap 15, K 2.9    Treatment:  8/10 Zosyn 4.5gm IV x1 dose  8/10 Vancomycin 2gm IV x1 dose  8/11-8/12 Ceftriaxone 1gm IV x2 doses    Risk Factors: PMHx: Alcohol abuse, CHINYERE, BPH, R AKA from septic prosthetic joint, CABG, former smoker, current MJ use.  BMI: 28.18  Options provided:  -- Sepsis secondary to UTI with acute organ dysfunction of Lactic Acidosis ruled out after workup  -- Sepsis secondary to UTI with acute organ dysfunction of Lactic Acidosis ruled in for this admission  -- Other - I will add my own diagnosis  -- Refer to Clinical Documentation Reviewer    Query created by: Latanya Melchor on 8/12/2024 12:52 PM      Electronically signed by:  MIRLANDE LOZANO MD 8/14/2024 4:39 PM

## 2024-08-17 LAB
ATRIAL RATE: 116 BPM
P AXIS: 34 DEGREES
P OFFSET: 183 MS
P ONSET: 134 MS
PR INTERVAL: 172 MS
Q ONSET: 220 MS
QRS COUNT: 20 BEATS
QRS DURATION: 92 MS
QT INTERVAL: 364 MS
QTC CALCULATION(BAZETT): 505 MS
QTC FREDERICIA: 453 MS
R AXIS: -11 DEGREES
T AXIS: 16 DEGREES
T OFFSET: 402 MS
VENTRICULAR RATE: 116 BPM

## 2024-08-29 DIAGNOSIS — G54.6 PHANTOM PAIN AFTER AMPUTATION OF LOWER EXTREMITY (MULTI): ICD-10-CM

## 2024-08-29 DIAGNOSIS — M19.011 OSTEOARTHROSIS, LOCALIZED, PRIMARY, SHOULDER REGION, RIGHT: ICD-10-CM

## 2024-08-29 DIAGNOSIS — S78.119A ABOVE-KNEE AMPUTATION (MULTI): ICD-10-CM

## 2024-08-30 RX ORDER — OXYCODONE HYDROCHLORIDE 5 MG/1
5 TABLET ORAL EVERY 8 HOURS PRN
Qty: 90 TABLET | Refills: 0 | Status: SHIPPED | OUTPATIENT
Start: 2024-08-30

## 2024-08-30 NOTE — TELEPHONE ENCOUNTER
Rx sent, but on chart review he was admitted 3 wk ago with alcohol intoxication and high level, will need to get him in treatment program.  Tough spot as he says he's drinking for pain control.   He as apt next week, will discuss then but please ask family to come in for apt.

## 2024-09-05 ENCOUNTER — APPOINTMENT (OUTPATIENT)
Dept: ORTHOPEDIC SURGERY | Facility: CLINIC | Age: 74
End: 2024-09-05
Payer: COMMERCIAL

## 2024-09-30 DIAGNOSIS — G54.6 PHANTOM PAIN AFTER AMPUTATION OF LOWER EXTREMITY (MULTI): ICD-10-CM

## 2024-09-30 DIAGNOSIS — M19.011 OSTEOARTHROSIS, LOCALIZED, PRIMARY, SHOULDER REGION, RIGHT: ICD-10-CM

## 2024-09-30 DIAGNOSIS — S78.119A ABOVE-KNEE AMPUTATION (MULTI): ICD-10-CM

## 2024-09-30 RX ORDER — OXYCODONE HYDROCHLORIDE 5 MG/1
5 TABLET ORAL EVERY 8 HOURS PRN
Qty: 90 TABLET | Refills: 0 | Status: SHIPPED | OUTPATIENT
Start: 2024-09-30

## 2024-10-28 DIAGNOSIS — M19.011 OSTEOARTHROSIS, LOCALIZED, PRIMARY, SHOULDER REGION, RIGHT: ICD-10-CM

## 2024-10-28 DIAGNOSIS — S78.119A ABOVE-KNEE AMPUTATION (MULTI): ICD-10-CM

## 2024-10-28 DIAGNOSIS — G54.6 PHANTOM PAIN AFTER AMPUTATION OF LOWER EXTREMITY (MULTI): ICD-10-CM

## 2024-10-28 RX ORDER — OXYCODONE HYDROCHLORIDE 5 MG/1
5 TABLET ORAL EVERY 8 HOURS PRN
Qty: 90 TABLET | Refills: 0 | Status: SHIPPED | OUTPATIENT
Start: 2024-10-28

## 2024-11-21 ENCOUNTER — APPOINTMENT (OUTPATIENT)
Dept: ORTHOPEDIC SURGERY | Facility: CLINIC | Age: 74
End: 2024-11-21
Payer: COMMERCIAL

## 2024-11-21 ENCOUNTER — HOSPITAL ENCOUNTER (OUTPATIENT)
Dept: RADIOLOGY | Facility: EXTERNAL LOCATION | Age: 74
Discharge: HOME | End: 2024-11-21

## 2024-11-21 DIAGNOSIS — M54.12 CERVICAL RADICULITIS: ICD-10-CM

## 2024-11-21 DIAGNOSIS — S78.119A ABOVE-KNEE AMPUTATION (MULTI): ICD-10-CM

## 2024-11-21 DIAGNOSIS — M25.511 CHRONIC PAIN OF BOTH SHOULDERS: ICD-10-CM

## 2024-11-21 DIAGNOSIS — M25.512 CHRONIC PAIN OF BOTH SHOULDERS: ICD-10-CM

## 2024-11-21 DIAGNOSIS — M19.011 OSTEOARTHROSIS, LOCALIZED, PRIMARY, SHOULDER REGION, RIGHT: Primary | ICD-10-CM

## 2024-11-21 DIAGNOSIS — G54.6 PHANTOM PAIN AFTER AMPUTATION OF LOWER EXTREMITY (MULTI): ICD-10-CM

## 2024-11-21 DIAGNOSIS — G89.29 CHRONIC PAIN OF BOTH SHOULDERS: ICD-10-CM

## 2024-11-21 PROCEDURE — 99214 OFFICE O/P EST MOD 30 MIN: CPT | Performed by: PHYSICAL MEDICINE & REHABILITATION

## 2024-11-21 PROCEDURE — 20610 DRAIN/INJ JOINT/BURSA W/O US: CPT | Performed by: PHYSICAL MEDICINE & REHABILITATION

## 2024-11-21 RX ORDER — OXYCODONE HYDROCHLORIDE 5 MG/1
5 TABLET ORAL EVERY 8 HOURS PRN
Qty: 90 TABLET | Refills: 0 | Status: SHIPPED | OUTPATIENT
Start: 2024-11-21

## 2024-11-21 NOTE — PROGRESS NOTES
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 .  f/u for oxycodone , right shoulder pain and repeat evaluation of phantom pain of his right leg, left total hip arthroplastyâ€“by Dr. Carter uncomplicated.      CC: Follow up for phantom pain  and bilateral shoulder pain.     HPI:   Ultrasound-guided bilateral glenohumeral and subacromial injections by Dr. Caitlyn Meier D.O. our fellow in November and then again February 14 2024 helps significantly with the right shoulder but the left side only a little. Had trouble getting back for repeat injection in [facility] in May - which was good but wore off after 2mo.  Says had trouble getting back to apt, pain meds even 3/d not enough, so was drinking again in August.  Says sober again since admission then.  Declined treatment programs for EtOH.  Says really just for the pain.      Reports same pain in the left > Right side more in the upper shoulder with radiation to both arms.  Has ongoing difficulty with prosthesis fit and he has essentially stopped using it, most time in wheelchair, NO longer  using the walker due to shoulder pain.      Patient reports no fevers, chills, sweats, night pain, weight loss or cancer history      Pertinent Physical Exam (see remainder below):  MSK: Cervical Spine: ROM moderately reduced all planes but more painful to left, Posture mod kyphotic, Tender psp, Spurling negative.  Decreased bilateral shoulder range of motion of the shoulders bilaterally right greater than left. right shoulder range NT aggressively due to severe pain.   Affect is good, history is accurate and focused appropriately , no sign of intoxication.  Actually seems in good spirits today.    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 Jesus Alberto Carter did left total hip arthroplasty Winter 2022    RECOMMENDATIONS:   - reviewed MRI cervical 3/14 - severe stenosis C3-4 but no cord deformity or intrinsic signal change.  Given good response to shoulder injections, I don't think the cervical stenosis is causing much pain.   - today did bilateral GH and SAB injections with USG - Right posterior (in plane), Left anterior (out of plane) for the GH injs  -L Inj/Asp: bilateral subacromial bursa on 11/21/2024 5:00 PM  Indications: pain  Details: 25 G needle, lateral approach  Medications (Right): 40 mg triamcinolone acetonide 40 mg/mL; 4 mL lidocaine PF 10 mg/mL (1 %)  Medications (Left): 40 mg triamcinolone acetonide 40 mg/mL; 4 mL lidocaine PF 10 mg/mL (1 %)  Outcome: tolerated well, no immediate complications    AND bilateral GH  injections with USG - Right posterior, Left anterior for the GHs  Procedure, treatment alternatives, risks and benefits explained, specific risks discussed. Consent was given by the patient. Immediately prior to procedure a time out was called to verify the correct patient, procedure, equipment, support staff and site/side marked as required. Patient was prepped and draped in the usual sterile fashion.     - previouslydiscussed possible right shoulder SS, Axillary and Lat pec block/RFA -will re-address if/when steroid injection wears off too soon  -Previously discussed surgery for shoulder - probably replacement but but with his right sided transfemoral amputation he will need inpatient rehab or SNF afterwards, and  I could facilitate this at [place].  -PNS was denied. - Likely he would need spinal cord stimulator implant. Recall, denial by private insurance company.   - off of neurontin due to oversedation?   - continue oxycodone 5 mg 2-3 / day.  Too painful lately to reduce dose to 2 tablets/day,  Target of 60 tablets/mo  would be good in future with better shoulder treatments.   UDS and controlled substance agreement 10/28/2022 -UDS was repeated 8/10/24 by other provider but unfortunately not our recommending compliance screening panel.  There was no opiate subcategory confirmatory assessment (and screening UDS is known to NOT detect low doses of oxycoodne, so no surprise to me that it was negative).  At least it was good enough for drugs of abuse, and given the absence of lab on site here and severe hardship of family dirving and him transferring out of wheelchair to another lab today (and potential for patient to take meds on way to test), I decided to defer repeat testing today.   Needs new UDS/Opiate agreement if establishing with Dr Meier.  SEE CONTROLLED SUBSTANCE HPI FORM    f/u with Dr Cony Meier in 2-3mo.   If patient desires I'll certainly see him myself.        Diagnoses and plan discussed with the patient, patient educated on above diagnoses and treatments, including alternatives   Rickey Wadsworth MD, FAAPMR, R-MSK  Chief, Division of PM&R  Board Certified in PM&R and Sports Medicine  ____________________________________________________________________     SUPPORTING DOCUMENTATION (remaining history, exam, other findings):     Work-up reviewed - this has included x-ray hips, moderate to severe osteoarthritis bilaterally right greater than left prior x-ray of shoulder was 2021     Treatment has included above, medications multiple, physical therapy, inpatient rehab, hip joint injections left side x2  Right subacromial injection helped for about 1.5 months. Back to baselin now. Then glenohumeral injection was  helpful but on last visit October 28, 2022 he had more pain in the superior aspect of the shoulder and we did AC joint injection again with relief there.  See above for Assessment and Plan          Last urine drug screening date/ordered:  08/10/2024 Controlled Substance Agreement:   I have printed this form and reviewed each line item with the patient and the patient has verbalized understanding.   Date of the last Controlled Substance Agreement:    OPIOID   What is the patient's goal of therapy? pain relief, ambulation with prosthesis.  Is this being achieved with current treatment? yes.   Attestation statement: I feel that it is clinically indicated to continue this current medication regimen after consideration of alternative therapies, and other non-opioid treatments.   Opioid Risk Screening:   Opioid Risk Tool   Last opioid risk screening date/ordered today:  11/21/2024  Personal history of substance abuse: Alcohol = 3  Psychological disease: Positive History of Depression = 1  Patient's total score is 4,~within range of Moderate Risk (4-7).   Pain Scale Screening:   Pain Assessment and Documentation Tool (PADT)   Date of Assessment: 11/21/2024  Analgesia:   Patient reports his pain level on average during the past week is 7 on a 0 - 10 scale.   Patient reports that his pain level at its worst during the past week was 10 on a 0 -10 scale.   40 % of pain has been relieved during the past week per patient   Query to clinician: Is the patient's pain relief clinically significant? Yes  Activities of Daily Living:   Physical functioning: Better   Family relationships: Same   Social relationships: Same   Mood: Same   Sleep patterns: Same   Overall functioning: Same   Adverse Events:   No, FABIANA HIDALGO is not experiencing side effects from current pain reliever.  Patients overall severity of side effect: None  Is your overall impression that this patient is benefiting (e.g., benefits, such as pain relief, outweigh  side effects) from opioid therapy? Yes   Potential Aberrant Drug-Related Behavior: Abusing alcohol or illicit drugs.  (No EtOH lately per pt, recent   Specific Analgesic Plan: Continue present regimen.  I have calculated the patient's Morphine Dose Equivalent (MED):   I have considered referral to Pain Management and/or a specialist, and do not feel it is necessary at this time.   ANTICONVULSANT   Activities of Daily Living:   Physical functioning: Better   Family relationships: Same   Social relationships: Same   Mood: Same   Sleep patterns: Same   Overall functioning: Same   Referrals or Alternatives: Pain Management: 2019?,~Psychiatry/Psychotherapy/ Behavioral Health: ,~Occupational Therapy: 2021,~Physical Therapy: 06/2021 and ongoing,~Cold: ,~Heat: .   Current or Past Use of Non-Controlled Medication: Topical Therapy,~NSAID,~Gabapentin.

## 2024-11-26 RX ORDER — TRIAMCINOLONE ACETONIDE 40 MG/ML
40 INJECTION, SUSPENSION INTRA-ARTICULAR; INTRAMUSCULAR
Status: COMPLETED | OUTPATIENT
Start: 2024-11-21 | End: 2024-11-21

## 2024-11-26 RX ORDER — LIDOCAINE HYDROCHLORIDE 10 MG/ML
4 INJECTION, SOLUTION EPIDURAL; INFILTRATION; INTRACAUDAL; PERINEURAL
Status: COMPLETED | OUTPATIENT
Start: 2024-11-21 | End: 2024-11-21

## 2024-12-24 DIAGNOSIS — G89.4 CHRONIC PAIN DISORDER: ICD-10-CM

## 2024-12-24 DIAGNOSIS — S78.119A ABOVE-KNEE AMPUTATION (MULTI): ICD-10-CM

## 2024-12-24 DIAGNOSIS — G54.6 PHANTOM PAIN AFTER AMPUTATION OF LOWER EXTREMITY (MULTI): ICD-10-CM

## 2024-12-24 DIAGNOSIS — M19.011 OSTEOARTHROSIS, LOCALIZED, PRIMARY, SHOULDER REGION, RIGHT: ICD-10-CM

## 2024-12-26 RX ORDER — OXYCODONE HYDROCHLORIDE 5 MG/1
5 TABLET ORAL EVERY 8 HOURS PRN
Qty: 90 TABLET | Refills: 0 | Status: SHIPPED | OUTPATIENT
Start: 2024-12-26

## 2024-12-26 RX ORDER — GABAPENTIN 300 MG/1
300 CAPSULE ORAL 3 TIMES DAILY
Qty: 90 CAPSULE | Refills: 11 | Status: SHIPPED | OUTPATIENT
Start: 2024-12-26 | End: 2025-12-26

## 2025-01-22 ENCOUNTER — TELEPHONE (OUTPATIENT)
Dept: ORTHOPEDIC SURGERY | Facility: CLINIC | Age: 75
End: 2025-01-22
Payer: COMMERCIAL

## 2025-01-22 DIAGNOSIS — S78.119A ABOVE-KNEE AMPUTATION (MULTI): ICD-10-CM

## 2025-01-22 DIAGNOSIS — G54.6 PHANTOM PAIN AFTER AMPUTATION OF LOWER EXTREMITY (MULTI): ICD-10-CM

## 2025-01-22 DIAGNOSIS — M19.011 OSTEOARTHROSIS, LOCALIZED, PRIMARY, SHOULDER REGION, RIGHT: ICD-10-CM

## 2025-01-23 RX ORDER — OXYCODONE HYDROCHLORIDE 5 MG/1
5 TABLET ORAL EVERY 8 HOURS PRN
Qty: 90 TABLET | Refills: 0 | Status: SHIPPED | OUTPATIENT
Start: 2025-01-23

## 2025-02-20 ENCOUNTER — APPOINTMENT (OUTPATIENT)
Dept: ORTHOPEDIC SURGERY | Facility: CLINIC | Age: 75
End: 2025-02-20
Payer: COMMERCIAL

## 2025-02-21 DIAGNOSIS — S78.119A ABOVE-KNEE AMPUTATION (MULTI): ICD-10-CM

## 2025-02-21 DIAGNOSIS — M19.011 OSTEOARTHROSIS, LOCALIZED, PRIMARY, SHOULDER REGION, RIGHT: ICD-10-CM

## 2025-02-21 DIAGNOSIS — G54.6 PHANTOM PAIN AFTER AMPUTATION OF LOWER EXTREMITY (MULTI): ICD-10-CM

## 2025-02-21 RX ORDER — OXYCODONE HYDROCHLORIDE 5 MG/1
5 TABLET ORAL EVERY 8 HOURS PRN
Qty: 90 TABLET | Refills: 0 | Status: SHIPPED | OUTPATIENT
Start: 2025-02-24

## 2025-02-27 ENCOUNTER — APPOINTMENT (OUTPATIENT)
Dept: ORTHOPEDIC SURGERY | Facility: CLINIC | Age: 75
End: 2025-02-27
Payer: COMMERCIAL

## 2025-02-27 ENCOUNTER — HOSPITAL ENCOUNTER (OUTPATIENT)
Dept: RADIOLOGY | Facility: EXTERNAL LOCATION | Age: 75
Discharge: HOME | End: 2025-02-27

## 2025-02-27 DIAGNOSIS — M25.511 CHRONIC PAIN OF BOTH SHOULDERS: ICD-10-CM

## 2025-02-27 DIAGNOSIS — Z79.899 HIGH RISK MEDICATION USE: ICD-10-CM

## 2025-02-27 DIAGNOSIS — G89.29 CHRONIC PAIN OF BOTH SHOULDERS: ICD-10-CM

## 2025-02-27 DIAGNOSIS — M19.011 OSTEOARTHROSIS, LOCALIZED, PRIMARY, SHOULDER REGION, RIGHT: ICD-10-CM

## 2025-02-27 DIAGNOSIS — M25.512 CHRONIC PAIN OF BOTH SHOULDERS: ICD-10-CM

## 2025-02-27 DIAGNOSIS — G54.6 PHANTOM PAIN AFTER AMPUTATION OF LOWER EXTREMITY (MULTI): Primary | ICD-10-CM

## 2025-02-27 PROCEDURE — 99214 OFFICE O/P EST MOD 30 MIN: CPT | Performed by: PHYSICAL MEDICINE & REHABILITATION

## 2025-02-27 PROCEDURE — 1159F MED LIST DOCD IN RCRD: CPT | Performed by: PHYSICAL MEDICINE & REHABILITATION

## 2025-02-27 PROCEDURE — 20611 DRAIN/INJ JOINT/BURSA W/US: CPT | Performed by: STUDENT IN AN ORGANIZED HEALTH CARE EDUCATION/TRAINING PROGRAM

## 2025-02-27 RX ADMIN — LIDOCAINE HYDROCHLORIDE 4 ML: 10 INJECTION, SOLUTION EPIDURAL; INFILTRATION; INTRACAUDAL; PERINEURAL at 11:20

## 2025-02-27 RX ADMIN — TRIAMCINOLONE ACETONIDE 40 MG: 40 INJECTION, SUSPENSION INTRA-ARTICULAR; INTRAMUSCULAR at 11:20

## 2025-02-27 SDOH — SOCIAL STABILITY: SOCIAL NETWORK: SOCIAL ACTIVITY:: 7

## 2025-02-27 NOTE — PROGRESS NOTES
FUV     CC: Follow up for phantom pain  and bilateral shoulder pain.     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   Has ongoing phantom pain of his right leg, left total hip arthroplasty “by Dr. Emma hearn. And b/l shoulder osteoarthritis treated with Ultrasound-guided bilateral glenohumeral and subacromial injections by Dr. Caitlyn Meier D.O. our fellow in November 2023 and then again February 14 2024.     Update  prior injections B shoulders , and Oct '24 helped significantly  x ~2.5 months, but past few weeks again pain meds even 3/d not enough - Says still sober  In the past 3 weeks he has had significant pain. Patient states the pain is associated with burning. Patient describes a constant pain.  Patient describes left pain worse than the right. Patient states medications do not help much when it reaches this level of pain and discomfort.      Patient reports no fevers, chills, sweats, night pain, weight loss or cancer history      Pertinent Physical Exam (see remainder below):  MSK: Cervical Spine: ROM moderately reduced all planes but more painful to left, Posture mod kyphotic, Tender psp, Spurling negative.    Affect is good, history is accurate and focused appropriately , no sign of intoxication.  Actually seems in good spirits today.    Neuro: Normal Sensation, strength, bulk and tone of upper  limbs bilaterally except trace proximal weakness ?cuff/shoulder guarding  Shoulder abduction limited to 15 degrees, otherwise in pain. Patient unable to externally rotate or internally rotate due to pain. Decreased bilateral range of motion, did not range aggressively due to pain.      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 Jesus Alberto Carter did left total hip arthroplasty Winter 2022    RECOMMENDATIONS:   - reviewed MRI cervical 3/14 - severe stenosis C3-4 but no cord deformity or intrinsic signal change.  Given good response to shoulder injections, I don't think the cervical stenosis is causing much pain.   - today did bilateral GH and SAB injections with USG - Right posterior (in plane), Left anterior (out of plane) for the GH injs    -L Inj/Asp: bilateral glenohumeral on 2/27/2025 11:20 AM  Indications: pain  Details: 25 G needle, ultrasound-guided lateral approach  Medications (Right): 40 mg triamcinolone acetonide 40 mg/mL; 4 mL lidocaine PF 10 mg/mL (1 %)  Medications (Left): 40 mg triamcinolone acetonide 40 mg/mL; 4 mL lidocaine PF 10 mg/mL (1 %)  Outcome: tolerated well, no immediate complications    Bilateral subacromial AND bilateral GH  injections with USG -  anterior approach for the Ghs    \"Ultrasound interpretation was performed prior to the procedure to identify the target and any adjacent neurovascular structures.  Subsequent interpretation was performed during real-time needle guidance and post-intervention confirming appropriate injectate flow and hemostasis.  Images were permanently stored in our PACS system, EMR or local secure device as needed.\"      Procedure, treatment alternatives, risks and benefits explained, specific risks discussed. Consent was given by the patient. Immediately prior to procedure a time out was called to verify the correct patient, procedure, equipment, support staff and site/side marked as required. Patient was prepped and draped in the usual sterile fashion.       - previously discussed possible right shoulder SS, Axillary and Lat pec " block/RFA -will re-address if/when steroid injection wears off too soon  -Previously discussed surgery for shoulder - probably replacement but but with his right sided transfemoral amputation he will need inpatient rehab or SNF afterwards, and I could facilitate this at [place].  -PNS was denied. - Likely he would need spinal cord stimulator implant. Recall, denial by private insurance company.   - off of neurontin due to oversedation?   - continue oxycodone 5 mg 2-3 / day.  Too painful lately to reduce dose to 2 tablets/day,  Target of 60 tablets/mo  would be good in future with better shoulder treatments.   UDS and controlled substance agreement 10/28/2022 -UDS was repeated today  SEE CONTROLLED SUBSTANCE HPI FORM    f/u with in 2-3 months.       Macey Franco MD  Fellow [facility], PM&R  Patient seen with Dr Wadsworth      Supervisory note:  Seen with Dr Macey Franco M.D.     fellow.  I saw and evaluated the patient. I personally obtained the key and critical portions of the history and physical exam. I reviewed the fellow's documentation and discussed the patient with the fellow. I agree with the fellow's medical decision making as documented in the note.     I directly supervised the injections today and actually needed to repeat each of them immediately after he finished because Dr. Franco misunderstood the dosing medication to be administered, but placement was very good and he tolerated it well in all 4 locations each time.  Will repeat UDS, and controlled substance agreement done today.  I reaffirmed with him very strictly to not take additional medication than prescribed, and discussed the relative risk of respiratory suppression with gabapentin opiates and alcohol and to strictly not use alcohol.  He confirmed.   We are using relatively low doses and with his recent use he is tolerating them well.    Rickey Wadsworth M.D, FAAPMR, R-MSK  Chief, Division of PM&R  Board Certified in PM&R, Sports  Medicine and Musculoskeletal Ultrasound    _     SUPPORTING DOCUMENTATION (remaining history, exam, other findings):     Work-up reviewed - this has included x-ray hips, moderate to severe osteoarthritis bilaterally right greater than left prior x-ray of shoulder was 2021     Treatment has included above, medications multiple, physical therapy, inpatient rehab, hip joint injections left side x2  Right subacromial injection helped for about 1.5 months. Back to baselin now. Then glenohumeral injection was helpful but on last visit October 28, 2022 he had more pain in the superior aspect of the shoulder and we did AC joint injection again with relief there.  See above for Assessment and Plan      CSI form:    Last urine drug screening date/ordered:  02/27/2025 Controlled Substance Agreement:   I have printed this form and reviewed each line item with the patient and the patient has verbalized understanding.   Date of the last Controlled Substance Agreement:    OPIOID   What is the patient's goal of therapy? pain relief, ambulation with prosthesis.  Is this being achieved with current treatment? yes.   Attestation statement: I feel that it is clinically indicated to continue this current medication regimen after consideration of alternative therapies, and other non-opioid treatments.   Opioid Risk Screening:   Opioid Risk Tool   Last opioid risk screening date/ordered today:  02/27/2025  Personal history of substance abuse: Alcohol = 3  Psychological disease: Positive History of Depression = 1  Patient's total score is 4,~within range of Moderate Risk (4-7).   Pain Scale Screening:   Pain Assessment and Documentation Tool (PADT)   Date of Assessment: 02/27/2025  Analgesia:   Patient reports his pain level on average during the past week is 8 on a 0 - 10 scale.   Patient reports that his pain level at its worst during the past week was 10 on a 0 -10 scale.   40 % of pain has been relieved during the past week per patient    Query to clinician: Is the patient's pain relief clinically significant? Yes  Activities of Daily Living:   Physical functioning: Same  Family relationships: Same   Social relationships: Same   Mood: Same   Sleep patterns: Same   Overall functioning: Same   Adverse Events:   No, FABIANA HIDALGO is not experiencing side effects from current pain reliever.  Patients overall severity of side effect: None  Is your overall impression that this patient is benefiting (e.g., benefits, such as pain relief, outweigh side effects) from opioid therapy? Yes   Potential Aberrant Drug-Related Behavior: Abusing alcohol or illicit drugs.  (No EtOH lately per pt, recent   Specific Analgesic Plan: Continue present regimen.  I have calculated the patient's Morphine Dose Equivalent (MED):   I have considered referral to Pain Management and/or a specialist, and do not feel it is necessary at this time.   ANTICONVULSANT   Activities of Daily Living:   Physical functioning: Same   Family relationships: Same   Social relationships: Same   Mood: Same   Sleep patterns: Same   Overall functioning: Same   Referrals or Alternatives: Pain Management: 2019?,~Psychiatry/Psychotherapy/ Behavioral Health: ,~Occupational Therapy: 2021,~Physical Therapy: 06/2021 and ongoing,~Cold: ,~Heat: .   Current or Past Use of Non-Controlled Medication: Topical Therapy,~NSAID,~Gabapentin.

## 2025-02-28 RX ORDER — LIDOCAINE HYDROCHLORIDE 10 MG/ML
4 INJECTION, SOLUTION EPIDURAL; INFILTRATION; INTRACAUDAL; PERINEURAL
Status: COMPLETED | OUTPATIENT
Start: 2025-02-27 | End: 2025-02-27

## 2025-02-28 RX ORDER — TRIAMCINOLONE ACETONIDE 40 MG/ML
40 INJECTION, SUSPENSION INTRA-ARTICULAR; INTRAMUSCULAR
Status: COMPLETED | OUTPATIENT
Start: 2025-02-27 | End: 2025-02-27

## 2025-03-04 ENCOUNTER — TELEPHONE (OUTPATIENT)
Dept: ORTHOPEDIC SURGERY | Facility: CLINIC | Age: 75
End: 2025-03-04
Payer: COMMERCIAL

## 2025-03-04 NOTE — TELEPHONE ENCOUNTER
General


Time Seen by Provider: 06/28/18 19:54


Narrative: 





CHIEF COMPLAINT: 


Left finger cut, neck pain





HISTORY OF PRESENT ILLNESS: 


Patient has 2 complaints.  The 1st complaint is a left finger laceration.  This 

occurred on Saturday evening while cutting rope with a knife.  He states that 

he should have come in, but I thought I could keep it closed myself." He 

reports pain at this time of cut but no longer painful.  He has irrigated it 

with water repeatedly.  He has used peroxide and antibiotic ointment.  He says 

now that he has a should have come in earlier.  He does not have any warmth or 

fever.  No difficulty bending or straightening the finger.  He has secondary 

complaint of neck pain.  The pain isn't present for year.  It is the same pain 

as previous, no worse and "maybe a little better."No trauma or injury.  No 

radiating pain of the extremities or legs.  No saddle anesthesia.  No thoracic 

or lumbar pain.  No history of trauma to the area at all.  He says the pain 

starts in the neck and moves up to the base of the skull and sometimes to the 

front of the head.  He has no nausea or vomiting.  No visual disturbance.  No 

fever.  No neck stiffness.  No rectal modifying factors.  No other associated 

complaints.





TIME OF INJURY:


Left index finger on Saturday evening, 5 days ago





TETANUS STATUS:


Less than 4 years ago





MEDICAL/SURGICAL/SOCIAL HISTORY:


Chronic neck pain no recent surgeries.  Daily smoker.  Occasional marijuana 

use.  Lives independently locally





REVIEW OF SYSTEMS:


Ten systems reviewed and are negative unless otherwise noted in the HPI





EXAMINATION


General Appearance:  Alert, no distress


Head: normocephalic, atraumatic


Cardiovascular:  Symmetric radial pulses 2+.  Brisk cap refill in all fingers 

including the left index finger.


Neurological:  A&O, sensory of the upper extremities symmetric.  Triceps 

strength,  strength, interossei  strength symmetric


Skin:  Warm and dry, no rash.  There is a 1.5 cm curvilinear laceration left 

index finger distal phalanx pad.  No involvement of the nail.  There is 3 mm 

distraction of the wound borders with clot formation.  No signs of infection.  

No obvious tendon injury.


Extremities:  Mild tenderness to the left finger over the laceration.  He has 

full extension and full flexion of the fingers including superficialis and 

profundus.  He is neurovascular intact with brisk cap refill.





DIFFERENTIAL DIAGNOSES:


Including but not limited to cervical radiculopathy, tension headache, muscular 

strain, laceration with delayed presentation, laceration with infection








MDM:


8:00 p.m.


Left index finger laceration from Saturday that will need irrigation but does 

not a candidate for primary closure at this time.  There is no tendon injury.  

No evidence of bony injury or signs of infection.  Tetanus up-to-date.  I have 

administered digital Block we will irrigate.  He also has a complaint of neck 

pain that seems to be related to the trapezius and/or a tension type headache 

without any evidence of radiculopathy.  His neuro exam is completely intact 

without deficits.  Strength is symmetric in the upper extremities at the elbows 

wrist interossei.





8:15 p.m.


I have reviewed the patient's previous evaluation for his neck pain here in 

January of this year.  He states that he has not been able to follow up with a 

surgeon because "every time they called they say they do not know anything 

about me." He reports that he has been seen by his primary care physician and 

given Flexeril and ibuprofen with no relief.





8:30 p.m.


Patient re-evaluated.  Wound has been irrigated and I have re-examine.  This is 

a wound that would have been a candidate for primary closure but now that he 

has delayed his presentation, were unable to do so.  He will be discharged with 

wound care and antibiotic treatment.  I instructed him to return here in 72 hr 

for re-evaluation of the wound.  At that time we will consider the possibility 

of secondary closure, but he may not be a candidate at that time we also 

discussed Medrol Dosepak and Valium for his neck pain and headache.  I would 

like him to follow up outpatient with primary care physician Neurosurgery for 

further imaging.  At this time he has no evidence of acute cord injury, or any 

cervical radiculopathy.  I have notified case management that he has had 

difficulty obtaining appointment for them to have assist with.  I have reviewed 

the previous records for this.  He is comfortable this plan and discharged in 

stable condition.








PROCEDURE: Digital Block


Indication: Finger laceration


Consent:  Verbal


Location:  Left index finger


Anesthesia: Lidocaine 1% plain, 0.25% Marcaine plain, 5mL


Description:  Base of the finger was prepped.  The above was infused without 

difficulty.  Tolerated well.  Good anesthesia.


Complications: None








SUPERVISION:


This patient was independently evaluated without direct involvement of or 

examination by the attending physician. 





ED Precautions: 


Worsening pain. Erythema, edema, cyanosis, pallor, paresthesia or anesthesia.











- History


Smoking Status: Current every day smoker





- Objective


Vital Signs: 


 Initial Vital Signs











Temperature (C)  98.4 F   06/28/18 19:12


 


Heart Rate  79   06/28/18 19:12


 


Respiratory Rate  18   06/28/18 19:12


 


Blood Pressure  147/79 H  06/28/18 19:12


 


O2 Sat (%)  96   06/28/18 19:12








 











O2 Delivery Mode               Room Air














Allergies/Adverse Reactions: 


 





No Known Allergies Allergy (Unverified 06/28/18 19:14)


 








Home Medications: 














 Medication  Instructions  Recorded


 


Cephalexin [Keflex (*)] 500 mg PO QID #28 cap 06/28/18


 


Diazepam [Valium 2 MG (*)] 2 mg PO TID PRN #12 tab 06/28/18


 


methylPREDNISolone [Medrol Dose 1 each PO AD #1 ea 06/28/18





Rickey]  














Departure





- Departure


Disposition: Home, Routine, Self-Care


Clinical Impression: 


Tension headache, chronic


Qualifiers:


 Intractability: not intractable Qualified Code(s): G44.229 - Chronic tension-

type headache, not intractable





Laceration of finger with delay in treatment


Qualifiers:


 Encounter type: initial encounter Qualified Code(s): S61.219A - Laceration 

without foreign body of unspecified finger without damage to nail, initial 

encounter





Condition: Good


Instructions:  Tension Headache (ED), Laceration Without Closure (ED)


Additional Instructions: 


1. Antibiotics as prescribed by mouth to completion for the finger laceration


2. You may return here in 72 hr for re-evaluation of the wound.  At that time 

we will evaluate for the possibility of closure


3. Medrol Dosepak and Valium as prescribed as needed for the neck pain and 

headache


4. Contact neurosurgeon as previously instructed to do so.  Case management has 

been notified of your difficulty obtaining appointment


5. Contact primary care physician at [facility]


6. Return to emergency department for worsening pain, numbness, tingling or 

weakness of the upper extremities, incontinence of bowel or bladder, difficulty 

using her arms  


Referrals: 


Titi Barr MD [Medical Doctor] - As per Instructions


Jossy Mijares DO [Doctor of Osteopathy] - As per Instructions


Prescriptions: 


Cephalexin [Keflex (*)] 500 mg PO QID #28 cap


Diazepam [Valium 2 MG (*)] 2 mg PO TID PRN #12 tab


 PRN Reason: neck pain


methylPREDNISolone [Medrol Dose Rickey] 1 each PO AD #1 ea Patient called and message left for patient to go to the nearest  facility to complete the Urine Opioid Drug screen test that was order 2/27/25 ASAP per order Dr. Wadsworth.

## 2025-03-08 LAB
1OH-MIDAZOLAM UR-MCNC: NEGATIVE NG/ML
7AMINOCLONAZEPAM UR-MCNC: NEGATIVE NG/ML
A-OH ALPRAZ UR-MCNC: NEGATIVE NG/ML
A-OH-TRIAZOLAM UR-MCNC: NEGATIVE NG/ML
AMPHETAMINES UR QL: NEGATIVE NG/ML
BARBITURATES UR QL: NEGATIVE NG/ML
BZE UR QL: NEGATIVE NG/ML
CODEINE UR-MCNC: NEGATIVE NG/ML
CREAT UR-MCNC: 110.1 MG/DL
DRUG SCREEN COMMENT UR-IMP: ABNORMAL
EDDP UR-MCNC: NEGATIVE NG/ML
FENTANYL UR-MCNC: NEGATIVE NG/ML
HYDROCODONE UR-MCNC: NEGATIVE NG/ML
HYDROMORPHONE UR-MCNC: NEGATIVE NG/ML
LORAZEPAM UR-MCNC: NEGATIVE NG/ML
METHADONE UR-MCNC: NEGATIVE NG/ML
MORPHINE UR-MCNC: NEGATIVE NG/ML
NORDIAZEPAM UR-MCNC: NEGATIVE NG/ML
NORFENTANYL UR-MCNC: NEGATIVE NG/ML
NORHYDROCODONE UR CFM-MCNC: NEGATIVE NG/ML
NOROXYCODONE UR CFM-MCNC: 694 NG/ML
NORTRAMADOL UR-MCNC: NEGATIVE NG/ML
OH-ETHYLFLURAZ UR-MCNC: NEGATIVE NG/ML
OXAZEPAM UR-MCNC: NEGATIVE NG/ML
OXIDANTS UR QL: NEGATIVE MCG/ML
OXYCODONE UR CFM-MCNC: NEGATIVE NG/ML
OXYMORPHONE UR CFM-MCNC: 182 NG/ML
PCP UR QL: NEGATIVE NG/ML
PH UR: 6.5 [PH] (ref 4.5–9)
QUEST 6 ACETYLMORPHINE: NEGATIVE NG/ML
QUEST NOTES AND COMMENTS: ABNORMAL
QUEST ZOLPIDEM: NEGATIVE NG/ML
TEMAZEPAM UR-MCNC: NEGATIVE NG/ML
THC UR QL: POSITIVE NG/ML
THC UR-MCNC: 1338 NG/ML
TRAMADOL UR-MCNC: NEGATIVE NG/ML
ZOLPIDEM PHENYL-4-CARB UR CFM-MCNC: NEGATIVE NG/ML

## 2025-03-24 DIAGNOSIS — S78.119A ABOVE-KNEE AMPUTATION (MULTI): ICD-10-CM

## 2025-03-24 DIAGNOSIS — G54.6 PHANTOM PAIN AFTER AMPUTATION OF LOWER EXTREMITY: ICD-10-CM

## 2025-03-24 DIAGNOSIS — M19.011 OSTEOARTHROSIS, LOCALIZED, PRIMARY, SHOULDER REGION, RIGHT: ICD-10-CM

## 2025-03-24 RX ORDER — OXYCODONE HYDROCHLORIDE 5 MG/1
5 TABLET ORAL EVERY 8 HOURS PRN
Qty: 90 TABLET | Refills: 0 | Status: SHIPPED | OUTPATIENT
Start: 2025-03-24

## 2025-04-21 DIAGNOSIS — M19.011 OSTEOARTHROSIS, LOCALIZED, PRIMARY, SHOULDER REGION, RIGHT: ICD-10-CM

## 2025-04-21 DIAGNOSIS — G54.6 PHANTOM PAIN AFTER AMPUTATION OF LOWER EXTREMITY: ICD-10-CM

## 2025-04-21 DIAGNOSIS — S78.119A ABOVE-KNEE AMPUTATION (MULTI): ICD-10-CM

## 2025-04-21 RX ORDER — OXYCODONE HYDROCHLORIDE 5 MG/1
5 TABLET ORAL EVERY 8 HOURS PRN
Qty: 90 TABLET | Refills: 0 | Status: SHIPPED | OUTPATIENT
Start: 2025-04-21

## 2025-05-20 DIAGNOSIS — S78.119A ABOVE-KNEE AMPUTATION (MULTI): ICD-10-CM

## 2025-05-20 DIAGNOSIS — M19.011 OSTEOARTHROSIS, LOCALIZED, PRIMARY, SHOULDER REGION, RIGHT: ICD-10-CM

## 2025-05-20 DIAGNOSIS — G54.6 PHANTOM PAIN AFTER AMPUTATION OF LOWER EXTREMITY: ICD-10-CM

## 2025-05-20 RX ORDER — OXYCODONE HYDROCHLORIDE 5 MG/1
5 TABLET ORAL EVERY 8 HOURS PRN
Qty: 90 TABLET | Refills: 0 | Status: SHIPPED | OUTPATIENT
Start: 2025-05-20

## 2025-05-29 ENCOUNTER — APPOINTMENT (OUTPATIENT)
Dept: ORTHOPEDIC SURGERY | Facility: CLINIC | Age: 75
End: 2025-05-29
Payer: COMMERCIAL

## 2025-06-18 DIAGNOSIS — G54.6 PHANTOM PAIN AFTER AMPUTATION OF LOWER EXTREMITY: ICD-10-CM

## 2025-06-18 DIAGNOSIS — M19.011 OSTEOARTHROSIS, LOCALIZED, PRIMARY, SHOULDER REGION, RIGHT: ICD-10-CM

## 2025-06-18 DIAGNOSIS — S78.119A ABOVE-KNEE AMPUTATION (MULTI): ICD-10-CM

## 2025-06-19 RX ORDER — OXYCODONE HYDROCHLORIDE 5 MG/1
5 TABLET ORAL EVERY 8 HOURS PRN
Qty: 90 TABLET | Refills: 0 | Status: SHIPPED | OUTPATIENT
Start: 2025-06-19

## 2025-07-17 ENCOUNTER — APPOINTMENT (OUTPATIENT)
Dept: ORTHOPEDIC SURGERY | Facility: CLINIC | Age: 75
End: 2025-07-17
Payer: COMMERCIAL

## 2025-07-17 ENCOUNTER — HOSPITAL ENCOUNTER (OUTPATIENT)
Dept: RADIOLOGY | Facility: EXTERNAL LOCATION | Age: 75
Discharge: HOME | End: 2025-07-17

## 2025-07-17 DIAGNOSIS — M25.512 CHRONIC PAIN OF BOTH SHOULDERS: Primary | ICD-10-CM

## 2025-07-17 DIAGNOSIS — G54.6 PHANTOM PAIN AFTER AMPUTATION OF LOWER EXTREMITY: ICD-10-CM

## 2025-07-17 DIAGNOSIS — M25.511 CHRONIC PAIN OF BOTH SHOULDERS: Primary | ICD-10-CM

## 2025-07-17 DIAGNOSIS — G89.29 CHRONIC PAIN OF BOTH SHOULDERS: Primary | ICD-10-CM

## 2025-07-17 DIAGNOSIS — S78.119A ABOVE-KNEE AMPUTATION (MULTI): ICD-10-CM

## 2025-07-17 DIAGNOSIS — M19.011 OSTEOARTHROSIS, LOCALIZED, PRIMARY, SHOULDER REGION, RIGHT: ICD-10-CM

## 2025-07-17 PROCEDURE — 1159F MED LIST DOCD IN RCRD: CPT | Performed by: PHYSICAL MEDICINE & REHABILITATION

## 2025-07-17 PROCEDURE — 20611 DRAIN/INJ JOINT/BURSA W/US: CPT | Performed by: PHYSICAL MEDICINE & REHABILITATION

## 2025-07-17 PROCEDURE — 99214 OFFICE O/P EST MOD 30 MIN: CPT | Performed by: PHYSICAL MEDICINE & REHABILITATION

## 2025-07-17 RX ORDER — OXYCODONE HYDROCHLORIDE 5 MG/1
5 TABLET ORAL EVERY 8 HOURS PRN
Qty: 90 TABLET | Refills: 0 | Status: SHIPPED | OUTPATIENT
Start: 2025-07-17

## 2025-07-17 RX ORDER — OXYCODONE HYDROCHLORIDE 5 MG/1
5 TABLET ORAL EVERY 8 HOURS PRN
Qty: 90 TABLET | Refills: 0 | Status: CANCELLED | OUTPATIENT
Start: 2025-07-17

## 2025-07-17 RX ADMIN — TRIAMCINOLONE ACETONIDE 40 MG: 40 INJECTION, SUSPENSION INTRA-ARTICULAR; INTRAMUSCULAR at 10:54

## 2025-07-17 RX ADMIN — LIDOCAINE HYDROCHLORIDE 4 ML: 10 INJECTION, SOLUTION EPIDURAL; INFILTRATION; INTRACAUDAL; PERINEURAL at 10:54

## 2025-07-17 SDOH — SOCIAL STABILITY: SOCIAL NETWORK: SOCIAL ACTIVITY:: 7

## 2025-07-17 NOTE — PROGRESS NOTES
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 Jesus Alberto Carter did left total hip arthroplasty Winter 2022    RECOMMENDATIONS:    Given good response to shoulder injections, I don't think the cervical stenosis is causing much pain.   - today did bilateral GH and SAB injections with USG - Right posterior (in plane), Left anterior (out of plane) for the GH injs    -L Inj/Asp: bilateral glenohumeral on 7/17/2025 10:54 AM  Indications: pain  Details: 25 G needle, ultrasound-guided lateral approach  Medications (Right): 40 mg triamcinolone acetonide 40 mg/mL; 4 mL lidocaine PF 10 mg/mL (1 %)  Medications (Left): 40 mg triamcinolone acetonide 40 mg/mL; 4 mL lidocaine PF 10 mg/mL (1 %)  Outcome: tolerated well, no immediate complications    Bilateral subacromial AND bilateral GH  injections with USG -  anterior approach for the Ghs    \"Ultrasound interpretation was performed prior to the procedure to identify the target and any adjacent neurovascular structures.  Subsequent interpretation was performed during real-time needle guidance and post-intervention confirming appropriate injectate flow and hemostasis.  Images were permanently stored in our PACS system, EMR or local secure device as needed.\"      Procedure, treatment alternatives, risks and benefits explained, specific risks discussed. Consent was given by the patient. Immediately prior to procedure a time out was called to verify the correct patient, procedure, equipment, support staff and site/side marked as required. Patient was prepped and draped in the usual sterile fashion.       - will c/s shoulder " surgery for possible rTSA  - previously discussed possible right shoulder SS, Axillary and Lat pec block/RFA -will re-address if/when s no teroid injection wears off too soon  -Previously discussed surgery for shoulder - probably replacement but but with his right sided transfemoral amputation he will need inpatient rehab or SNF afterwards, and I could facilitate this at [facility].  -PNS was denied. - Likely he would need spinal cord stimulator implant. Recall, denial by damon possible mild patient.  Patient will go for close-please surgery as noted to coordinate with radiology [facility].   - off of neurontin due to oversedation?   - continue oxycodone 5 mg 2-3 / day.  Too painful lately to reduce dose to 2 tablets/day,  Target of 60 tablets/mo  would be good in future with better shoulder treatments.   UDS and controlled substance agreement 02/27/2025   f/u with in 3 months.       Macey Franco MD  Fellow [facility], PM&R  Patient seen with Dr Wadsworth    Supervisory note:  Seen with Dr Macey Franco M.D.   fellow.   I saw and evaluated the patient. I personally obtained the key and critical portions of the history and physical exam. I reviewed the fellow's documentation and discussed the patient with the fellow. I agree with the fellow's medical decision making as documented in the note.     I directly supervised the injections today reviewed repeat UDS, and controlled substance agreement May'25.  I reaffirmed with him very strictly to not take additional medication than prescribed, and discussed the relative risk of respiratory suppression with gabapentin opiates and THC but he assures not taking much and stopped alcohol in Oct '24 again.   We are using relatively low doses of all meds, and he is tolerating them well. Counseled him on appropriate very low dose Medical marijuana use. No EtOH.      F/u 90d   Rickey Wadsworth M.D, FAAPMR, R-MSK  Chief, Division of PM&R  Board Certified in  PM&R, Sports Medicine and Musculoskeletal Ultrasound    -----------------------------------------------------     FUV     CC: Follow up for phantom pain  and bilateral shoulder pain.     History of Present Illness Seen at the request of myself -  S/p Rt transfemoral amputation for septic total knee and osteomyelitis/sepsis, then alcohol intoxication landed him in Inpatient medical rehab in June '21.   Has ongoing phantom pain of his right leg, left total hip arthroplasty “by Dr. Emma hearn. And b/l shoulder osteoarthritis treated with Ultrasound-guided bilateral glenohumeral and subacromial injections by Dr. Caitlyn Meier D.O. our fellow in November 2023 and then again February 14 2024.     Update :  prior injections B shoulders , and Oct '24 then February 2025 helped significantly  x ~2.5 months,   Constant pain both shoulders, burning aching worse with activity transfers and better with medications.  Medications are really helping when he needs them.      Patient reports no fevers, chills, sweats, night pain, weight loss or cancer history      Pertinent Physical Exam (see remainder below):  Affect is good, history is accurate and focused appropriately , no sign of intoxication.  Actually seems in good spirits today.    Neuro: Normal Sensation, strength, bulk and tone of upper  limbs bilaterally except trace proximal weakness ?cuff/shoulder guarding  Shoulder abduction limited-but not tested aggressively because of her known deficit and pain, no palpable effusion or warmth skin is intact      SUPPORTING DOCUMENTATION (remaining history, exam, other findings):     Work-up reviewed - this has included x-ray hips, moderate to severe osteoarthritis bilaterally right greater than left prior x-ray of shoulder was 2021  - reviewed MRI cervical 3/14 - severe stenosis C3-4 but no cord deformity or intrinsic signal change.      Treatment has included above, medications multiple, physical therapy, inpatient rehab,  hip joint injections left side x2  Right subacromial injection helped for about 1.5 months. Back to baselin now. Then glenohumeral injection was helpful but on last visit October 28, 2022 he had more pain in the superior aspect of the shoulder and we did AC joint injection again with relief there.  See above for Assessment and Plan      CSI form:    Last urine drug screening date/ordered:  02/27/2025 Controlled Substance Agreement:   I have printed this form and reviewed each line item with the patient and the patient has verbalized understanding.   Date of the last Controlled Substance Agreement:    OPIOID   What is the patient's goal of therapy? pain relief, ambulation with prosthesis.  Is this being achieved with current treatment? yes.   Attestation statement: I feel that it is clinically indicated to continue this current medication regimen after consideration of alternative therapies, and other non-opioid treatments.   Opioid Risk Screening:   Opioid Risk Tool   Last opioid risk screening date/ordered today:  02/27/2025  Personal history of substance abuse: Alcohol = 3  Psychological disease: Positive History of Depression = 1  Patient's total score is 4,~within range of Moderate Risk (4-7).   Pain Scale Screening:   Pain Assessment and Documentation Tool (PADT)   Date of Assessment: 7/17/2025    Analgesia:   Patient reports his pain level on average during the past week is 8 on a 0 - 10 scale.   Patient reports that his pain level at its worst during the past week was 10 on a 0 -10 scale.   40 % of pain has been relieved during the past week per patient   Query to clinician: Is the patient's pain relief clinically significant? Yes  Activities of Daily Living:   Physical functioning: Same  Family relationships: Same   Social relationships: Same   Mood: Same   Sleep patterns: Same   Overall functioning: Same   Adverse Events:   No, FABIANA HIDALGO is not experiencing side effects from current pain  reliever.  Patients overall severity of side effect: None  Is your overall impression that this patient is benefiting (e.g., benefits, such as pain relief, outweigh side effects) from opioid therapy? Yes   Potential Aberrant Drug-Related Behavior: Abusing alcohol or illicit drugs.  (No EtOH lately per pt, recent   Specific Analgesic Plan: Continue present regimen.  I have calculated the patient's Morphine Dose Equivalent (MED):   I have considered referral to Pain Management and/or a specialist, and do not feel it is necessary at this time.   ANTICONVULSANT   Activities of Daily Living:   Physical functioning: Same   Family relationships: Same   Social relationships: Same   Mood: Same   Sleep patterns: Same   Overall functioning: Same   Referrals or Alternatives: Pain Management: 2019?,~Psychiatry/Psychotherapy/ Behavioral Health: ,~Occupational Therapy: 2021,~Physical Therapy: 06/2021 and ongoing,~Cold: ,~Heat: .   Current or Past Use of Non-Controlled Medication: Topical Therapy,~NSAID,~Gabapentin.

## 2025-07-20 RX ORDER — LIDOCAINE HYDROCHLORIDE 10 MG/ML
4 INJECTION, SOLUTION EPIDURAL; INFILTRATION; INTRACAUDAL; PERINEURAL
Status: COMPLETED | OUTPATIENT
Start: 2025-07-17 | End: 2025-07-17

## 2025-07-20 RX ORDER — TRIAMCINOLONE ACETONIDE 40 MG/ML
40 INJECTION, SUSPENSION INTRA-ARTICULAR; INTRAMUSCULAR
Status: COMPLETED | OUTPATIENT
Start: 2025-07-17 | End: 2025-07-17

## 2025-07-23 ENCOUNTER — ANCILLARY PROCEDURE (OUTPATIENT)
Dept: ORTHOPEDIC SURGERY | Facility: CLINIC | Age: 75
End: 2025-07-23
Payer: COMMERCIAL

## 2025-07-23 ENCOUNTER — APPOINTMENT (OUTPATIENT)
Dept: ORTHOPEDIC SURGERY | Facility: CLINIC | Age: 75
End: 2025-07-23
Payer: COMMERCIAL

## 2025-07-23 DIAGNOSIS — M25.511 RIGHT SHOULDER PAIN, UNSPECIFIED CHRONICITY: ICD-10-CM

## 2025-07-23 PROCEDURE — 73030 X-RAY EXAM OF SHOULDER: CPT | Mod: RIGHT SIDE | Performed by: STUDENT IN AN ORGANIZED HEALTH CARE EDUCATION/TRAINING PROGRAM

## 2025-07-23 PROCEDURE — 1125F AMNT PAIN NOTED PAIN PRSNT: CPT | Performed by: STUDENT IN AN ORGANIZED HEALTH CARE EDUCATION/TRAINING PROGRAM

## 2025-07-23 PROCEDURE — 1159F MED LIST DOCD IN RCRD: CPT | Performed by: STUDENT IN AN ORGANIZED HEALTH CARE EDUCATION/TRAINING PROGRAM

## 2025-07-23 PROCEDURE — 99204 OFFICE O/P NEW MOD 45 MIN: CPT | Performed by: STUDENT IN AN ORGANIZED HEALTH CARE EDUCATION/TRAINING PROGRAM

## 2025-07-23 ASSESSMENT — PAIN - FUNCTIONAL ASSESSMENT: PAIN_FUNCTIONAL_ASSESSMENT: 0-10

## 2025-07-23 ASSESSMENT — PAIN DESCRIPTION - DESCRIPTORS: DESCRIPTORS: SHARP;THROBBING;PINS AND NEEDLES

## 2025-07-23 ASSESSMENT — PAIN SCALES - GENERAL: PAINLEVEL_OUTOF10: 8

## 2025-07-28 DIAGNOSIS — F41.8 DEPRESSION WITH ANXIETY: ICD-10-CM

## 2025-07-28 RX ORDER — BUSPIRONE HYDROCHLORIDE 10 MG/1
TABLET ORAL
Qty: 45 TABLET | Refills: 0 | Status: SHIPPED | OUTPATIENT
Start: 2025-07-28

## 2025-08-06 NOTE — PROGRESS NOTES
PRIMARY CARE PHYSICIAN: Aram Yancey MD  REFERRING PROVIDER: No referring provider defined for this encounter.     CONSULT ORTHOPAEDIC: Shoulder Evaluation    ASSESSMENT & PLAN    Impression/Plan: This is a 74-year-old male presenting for evaluation of chronic right shoulder pain.  At this time, based on clinical history, physical examination, and imaging he is suffering from rotator cuff arthropathy to the right shoulder.  We discussed ongoing versus operative management.  Operative inventions would consist of a reverse total shoulder arthroplasty.  He is uninterested in moving forward with this at this time.  He will continue with nonoperative modalities which would include oral anti-inflammatories and Tylenol for pain relief, corticosteroid injections (most recently administered by Dr. Watkins on 7/1720 25).  If he were to fail to improve or would like to consider surgery he will need to wait 3 months as his most recent injection.  All questions were answered.    SUBJECTIVE  CHIEF COMPLAINT:   Chief Complaint   Patient presents with    Right Shoulder - Pain     NPV - Pain on shoulder has been going on for 50 yrs. Ref by [facility]. Sx consults.         HPI: Lalo Pack is a 74 y.o. patient. Lalo Pack presents for evaluation of chronic right shoulder pain.  He has been dealing with chronic right shoulder pain without previous history of surgical intervention.  He has undergone several corticosteroid injections most recently in July 2025.  He reports this provides some short-term pain relief.  He has daily pain, decreased motion, and weakness.  He presents today for discuss the shoulder and possible surgical management.    REVIEW OF SYSTEMS  Constitutional: See HPI for pain assessment, No significant weight loss, recent trauma  Cardiovascular: No chest pain, shortness of breath  Respiratory: No difficulty breathing, cough  Gastrointestinal: No nausea, vomiting, diarrhea, constipation  Musculoskeletal:  Noted in HPI, positive for pain, restricted motion, stiffness  Integumentary: No rashes, easy bruising, redness   Neurological: no numbness or tingling in extremities, no gait disturbances   Psychiatric: No mood changes, memory changes, social issues  Heme/Lymph: no excessive swelling, easy bruising, excessive bleeding  ENT: No hearing changes  Eyes: No vision changes    Medical History[1]     Allergies[2]     Surgical History[3]     Family History[4]     Social History     Socioeconomic History    Marital status:      Spouse name: Not on file    Number of children: Not on file    Years of education: Not on file    Highest education level: Not on file   Occupational History    Not on file   Tobacco Use    Smoking status: Former     Types: Cigarettes    Smokeless tobacco: Never   Substance and Sexual Activity    Alcohol use: Yes     Alcohol/week: 6.0 standard drinks of alcohol     Types: 6 Shots of liquor per week     Comment: rarely    Drug use: Yes     Types: Marijuana    Sexual activity: Defer   Other Topics Concern    Not on file   Social History Narrative    Not on file     Social Drivers of Health     Financial Resource Strain: Low Risk  (8/12/2024)    Overall Financial Resource Strain (CARDIA)     Difficulty of Paying Living Expenses: Not hard at all   Food Insecurity: No Food Insecurity (8/12/2024)    Hunger Vital Sign     Worried About Running Out of Food in the Last Year: Never true     Ran Out of Food in the Last Year: Never true   Transportation Needs: No Transportation Needs (8/12/2024)    PRAPARE - Transportation     Lack of Transportation (Medical): No     Lack of Transportation (Non-Medical): No   Physical Activity: Inactive (8/12/2024)    Exercise Vital Sign     Days of Exercise per Week: 0 days     Minutes of Exercise per Session: 0 min   Stress: Stress Concern Present (8/12/2024)    [employer] [place] [employer] - Occupational Stress Questionnaire     Feeling of Stress : To some  extent   Social Connections: Socially Isolated (8/12/2024)    Social Connection and Isolation Panel     Frequency of Communication with Friends and Family: Never     Frequency of Social Gatherings with Friends and Family: Never     Attends Yarsani Services: Never     Active Member of Clubs or Organizations: No     Attends Club or Organization Meetings: Never     Marital Status:    Intimate Partner Violence: Not At Risk (8/12/2024)    Humiliation, Afraid, Rape, and Kick questionnaire     Fear of Current or Ex-Partner: No     Emotionally Abused: No     Physically Abused: No     Sexually Abused: No   Housing Stability: Low Risk  (8/12/2024)    Housing Stability Vital Sign     Unable to Pay for Housing in the Last Year: No     Number of Times Moved in the Last Year: 0     Homeless in the Last Year: No        CURRENT MEDICATIONS:   Current Medications[5]     OBJECTIVE    PHYSICAL EXAM      8/13/2024     1:00 AM 8/13/2024     4:05 AM 8/13/2024     6:15 AM 8/13/2024     6:58 AM 8/13/2024     8:00 AM 8/13/2024    11:24 AM 8/13/2024     7:00 PM   Vitals   Systolic 142   140 140 131 153   Diastolic 84   83 83 91 105   BP Location  Left arm  Right arm  Right arm Left arm   Heart Rate  101  99  96 107   Temp  36.9 °C (98.4 °F)  35.8 °C (96.4 °F)  36.6 °C (97.9 °F) 36.6 °C (97.9 °F)   Resp  17    20 18   Weight (lb)   202.16       BMI   28.21 kg/m2       BSA (m2)   2.14 m2          There is no height or weight on file to calculate BMI.    GENERAL: A/Ox3, NAD. Appears healthy, well nourished  PSYCHIATRIC: Mood stable, appropriate memory recall  EYES: EOM intact, no scleral icterus  CARDIAC: regular rate  LUNGS: Breathing non-labored  SKIN: no erythema, rashes, or ecchymoses     MUSCULOSKELETAL:  This is a well-appearing patient in no acute distress.    There is no significant tenderness to palpation along the SC joint, AC joint, clavicle, acromion, or spine of the scapula.  There is moderate tenderness along the proximal  aspect of the biceps tendon.  Active range of motion: forward flexion 140 degrees, abduction 60 degrees, external rotation 20 degrees, internal rotation to lumbar spine.  Strength: 4 /5 to the supraspinatus, infraspinatus, subscapularis.  Negative Hornblower's.  Stability: Anterior/Posterior load and shift stable  Positive Speed's and Yergason's.  Positive [place]'s.  Positive Neer and Chew.  The patient is firing the AIN/PIN/median/radial/ulnar/axillary distributions.  The patient is sensate to light touch in the median/radial/ulnar/axillary distributions.  2+ radial pulse.    NEUROVASCULAR:  - Neurovascular Status: sensation intact to light touch distally, upper extremity motor grossly intact  - Capillary refill brisk at extremities, radial pulse 2+    Imaging: Multiple views of the affected right shoulder(s) demonstrate: No evidence of acute fracture or dislocation.  There is a high riding with a narrowed acromiohumeral interval.   X-rays were personally reviewed and interpreted by me.  Radiology reports were reviewed by me as well, if readily available at the time.        Lj Naik MD, MPH  Attending Surgeon  Sports Medicine Orthopaedic Surgery  [facility] [place]                              [1]   Past Medical History:  Diagnosis Date    Arthritis     Fracture of unspecified metatarsal bone(s), unspecified foot, initial encounter for closed fracture 01/18/2020    Fracture of metatarsal bone, closed    Other specified disorders of bone, lower leg 10/10/2019    Tibial pain    Pain in right foot 10/14/2019    Right foot pain    Pain, unspecified 04/23/2021    Pain    Sleep apnea     Unspecified fracture of shaft of right tibia, initial encounter for closed fracture 09/23/2021    Fracture of right tibia   [2]   Allergies  Allergen Reactions    Cephalexin Syncope, Unknown and Other     Passed out    Simvastatin Unknown    Triamterene-Hydrochlorothiazid Unknown     Ibuprofen GI intolerance, GI Upset and Other   [3]   Past Surgical History:  Procedure Laterality Date    AMPUTATION      JOINT REPLACEMENT      KNEE ARTHROPLASTY      TUMOR EXCISION Left     left hand    WRIST FUSION Right    [4] No family history on file.  [5]   Current Outpatient Medications   Medication Sig Dispense Refill    busPIRone (Buspar) 10 mg tablet TAKE ONE-HALF TABLET BY MOUTH THREE TIMES DAILY AS NEEDED FOR ANXIETY 45 tablet 0    gabapentin (Neurontin) 300 mg capsule Take 1 capsule (300 mg) by mouth 3 times a day. 90 capsule 11    levETIRAcetam (Keppra) 500 mg tablet Take 2 tablets (1,000 mg) by mouth.      oxyCODONE (Roxicodone) 5 mg immediate release tablet Take 1 tablet (5 mg) by mouth every 8 hours if needed for severe pain (7 - 10). 90 tablet 0     No current facility-administered medications for this visit.

## 2025-08-15 DIAGNOSIS — G54.6 PHANTOM PAIN AFTER AMPUTATION OF LOWER EXTREMITY: ICD-10-CM

## 2025-08-15 DIAGNOSIS — S78.119A ABOVE-KNEE AMPUTATION (MULTI): ICD-10-CM

## 2025-08-15 DIAGNOSIS — M19.011 OSTEOARTHROSIS, LOCALIZED, PRIMARY, SHOULDER REGION, RIGHT: ICD-10-CM

## 2025-08-15 RX ORDER — OXYCODONE HYDROCHLORIDE 5 MG/1
5 TABLET ORAL EVERY 8 HOURS PRN
Qty: 90 TABLET | Refills: 0 | Status: SHIPPED | OUTPATIENT
Start: 2025-08-15

## 2025-08-25 DIAGNOSIS — F41.8 DEPRESSION WITH ANXIETY: ICD-10-CM

## 2025-08-26 RX ORDER — BUSPIRONE HYDROCHLORIDE 10 MG/1
10 TABLET ORAL 2 TIMES DAILY PRN
Qty: 45 TABLET | Refills: 0 | Status: SHIPPED | OUTPATIENT
Start: 2025-08-26

## 2025-10-16 ENCOUNTER — APPOINTMENT (OUTPATIENT)
Dept: ORTHOPEDIC SURGERY | Facility: CLINIC | Age: 75
End: 2025-10-16
Payer: COMMERCIAL